# Patient Record
Sex: FEMALE | Race: BLACK OR AFRICAN AMERICAN | NOT HISPANIC OR LATINO | Employment: UNEMPLOYED | URBAN - METROPOLITAN AREA
[De-identification: names, ages, dates, MRNs, and addresses within clinical notes are randomized per-mention and may not be internally consistent; named-entity substitution may affect disease eponyms.]

---

## 2023-09-08 ENCOUNTER — HOSPITAL ENCOUNTER (EMERGENCY)
Facility: HOSPITAL | Age: 28
Discharge: HOME | End: 2023-09-08
Attending: EMERGENCY MEDICINE
Payer: MEDICAID

## 2023-09-08 VITALS
HEART RATE: 83 BPM | DIASTOLIC BLOOD PRESSURE: 90 MMHG | WEIGHT: 144 LBS | OXYGEN SATURATION: 99 % | TEMPERATURE: 99 F | BODY MASS INDEX: 23.99 KG/M2 | RESPIRATION RATE: 20 BRPM | HEIGHT: 65 IN | SYSTOLIC BLOOD PRESSURE: 143 MMHG

## 2023-09-08 DIAGNOSIS — B34.9 VIRAL SYNDROME: Primary | ICD-10-CM

## 2023-09-08 LAB
GROUP A STREP, MOLECULAR: NEGATIVE
INFLUENZA A, MOLECULAR: NEGATIVE
INFLUENZA B, MOLECULAR: NEGATIVE
SARS-COV-2 RDRP RESP QL NAA+PROBE: NEGATIVE
SPECIMEN SOURCE: NORMAL

## 2023-09-08 PROCEDURE — 99283 EMERGENCY DEPT VISIT LOW MDM: CPT

## 2023-09-08 PROCEDURE — 87502 INFLUENZA DNA AMP PROBE: CPT | Performed by: EMERGENCY MEDICINE

## 2023-09-08 PROCEDURE — 87651 STREP A DNA AMP PROBE: CPT | Performed by: EMERGENCY MEDICINE

## 2023-09-08 PROCEDURE — U0002 COVID-19 LAB TEST NON-CDC: HCPCS | Performed by: EMERGENCY MEDICINE

## 2023-09-08 RX ORDER — ONDANSETRON 4 MG/1
4 TABLET, ORALLY DISINTEGRATING ORAL EVERY 8 HOURS PRN
Qty: 12 TABLET | Refills: 0 | Status: SHIPPED | OUTPATIENT
Start: 2023-09-08

## 2023-09-08 NOTE — Clinical Note
"Yvan Ceballosa" Karena was seen and treated in our emergency department on 9/8/2023.  She may return to work on 09/11/2023.       If you have any questions or concerns, please don't hesitate to call.       RN    "

## 2023-09-08 NOTE — ED PROVIDER NOTES
Encounter Date: 9/8/2023       History     Chief Complaint   Patient presents with    Generalized Body Aches     With sore throat     28-year-old female with no past medical history presents with multiple complaints.  The patient reports that she woke up tonight with a sore throat, nonproductive cough, nausea, and body aches.  She denies any associated vomiting, diarrhea, fever/chills, shortness of breath, chest pain, dysuria, or abdominal pain.  There are no alleviating or aggravating factors.  The patient has not taken anything for relief of her symptoms.  She denies any recent sick contacts.      Review of patient's allergies indicates:  No Known Allergies  No past medical history on file.  No past surgical history on file.  No family history on file.     Review of Systems   Constitutional:  Negative for chills and fever.   HENT:  Positive for sore throat. Negative for congestion.    Respiratory:  Positive for cough. Negative for shortness of breath.    Cardiovascular:  Negative for chest pain.   Gastrointestinal:  Positive for nausea. Negative for abdominal pain and vomiting.   Genitourinary:  Negative for dysuria.   Musculoskeletal:  Positive for myalgias. Negative for gait problem.   Skin:  Negative for color change.   Neurological:  Negative for dizziness and numbness.   Psychiatric/Behavioral:  Negative for agitation.        Physical Exam     Initial Vitals [09/08/23 0404]   BP Pulse Resp Temp SpO2   (!) 143/90 83 20 98.8 °F (37.1 °C) 99 %      MAP       --         Physical Exam    Nursing note and vitals reviewed.  Constitutional: She appears well-developed and well-nourished.   HENT:   Head: Normocephalic and atraumatic.   Nose: Nose normal.   Mouth/Throat: Oropharynx is clear and moist. No oropharyngeal exudate.   Uvula is midline.  No peritonsillar edema or abscess noted.   Eyes: EOM are normal. Pupils are equal, round, and reactive to light.   Neck:   Normal range of motion.  Cardiovascular:  Normal  rate and regular rhythm.           Pulmonary/Chest: Breath sounds normal.   Abdominal: Abdomen is soft. Bowel sounds are normal. She exhibits no distension. There is no abdominal tenderness. There is no rebound and no guarding.   Musculoskeletal:         General: Normal range of motion.      Right shoulder: Normal.      Left shoulder: Normal.      Cervical back: Normal range of motion.     Neurological: She is alert and oriented to person, place, and time.   Skin: Skin is warm and dry.   Psychiatric: She has a normal mood and affect.         ED Course   Procedures  Labs Reviewed   INFLUENZA A & B BY MOLECULAR   GROUP A STREP, MOLECULAR   SARS-COV-2 RNA AMPLIFICATION, QUAL          Imaging Results    None          Medications - No data to display  Medical Decision Making  Risk  Prescription drug management.                          Medical Decision Making:   Initial Assessment:   28-year-old female presented with multiple complaints.  Differential Diagnosis:   Initial differential diagnosis included but not limited to COVID, influenza, and strep infection.  Clinical Tests:   Lab Tests: Ordered and Reviewed  ED Management:  The patient was emergently evaluated in the emergency department, her evaluation was significant for a young female with a benign exam.  The patient's COVID, influenza, and strep tests were noted to be negative.  The etiology of her symptoms is likely a different viral illness.  The patient is stable for discharge home and does not require further workup or care at present.  She will be discharged home with a prescription for ODT Zofran.  She is to take over-the-counter medication as needed for symptomatic relief.  She is referred to primary care for follow-up.      Clinical Impression:   Final diagnoses:  [B34.9] Viral syndrome (Primary)        ED Disposition Condition    Discharge Stable          ED Prescriptions       Medication Sig Dispense Start Date End Date Auth. Provider    ondansetron  (ZOFRAN-ODT) 4 MG TbDL Take 1 tablet (4 mg total) by mouth every 8 (eight) hours as needed (nausea/vomiting). 12 tablet 9/8/2023 -- Markell Maloney MD          Follow-up Information       Follow up With Specialties Details Why Contact Info    [facility]  Schedule an appointment as soon as possible for a visit   [address] [place] [id number]  [phone]               Markell Maloney MD  09/08/23 6680

## 2023-09-23 ENCOUNTER — HOSPITAL ENCOUNTER (EMERGENCY)
Facility: HOSPITAL | Age: 28
Discharge: LEFT BEFORE DISCHARGE | End: 2023-09-23
Attending: EMERGENCY MEDICINE
Payer: MEDICAID

## 2023-09-23 VITALS
WEIGHT: 180 LBS | DIASTOLIC BLOOD PRESSURE: 67 MMHG | BODY MASS INDEX: 28.93 KG/M2 | HEART RATE: 86 BPM | HEIGHT: 66 IN | TEMPERATURE: 98 F | SYSTOLIC BLOOD PRESSURE: 129 MMHG | RESPIRATION RATE: 18 BRPM | OXYGEN SATURATION: 98 %

## 2023-09-23 DIAGNOSIS — R10.9 ABDOMINAL PAIN, UNSPECIFIED ABDOMINAL LOCATION: Primary | ICD-10-CM

## 2023-09-23 DIAGNOSIS — Z34.90 EARLY STAGE OF PREGNANCY: ICD-10-CM

## 2023-09-23 LAB
ALBUMIN SERPL BCP-MCNC: 3.7 G/DL (ref 3.5–5.2)
ALP SERPL-CCNC: 99 U/L (ref 55–135)
ALT SERPL W/O P-5'-P-CCNC: 14 U/L (ref 10–44)
ANION GAP SERPL CALC-SCNC: 9 MMOL/L (ref 8–16)
AST SERPL-CCNC: 13 U/L (ref 10–40)
B-HCG UR QL: POSITIVE
BACTERIA #/AREA URNS HPF: NORMAL /HPF
BASOPHILS # BLD AUTO: 0.03 K/UL (ref 0–0.2)
BASOPHILS NFR BLD: 0.3 % (ref 0–1.9)
BILIRUB SERPL-MCNC: 0.3 MG/DL (ref 0.1–1)
BILIRUB UR QL STRIP: NEGATIVE
BUN SERPL-MCNC: 6 MG/DL (ref 6–20)
CALCIUM SERPL-MCNC: 8.9 MG/DL (ref 8.7–10.5)
CHLORIDE SERPL-SCNC: 103 MMOL/L (ref 95–110)
CLARITY UR: ABNORMAL
CO2 SERPL-SCNC: 24 MMOL/L (ref 23–29)
COLOR UR: YELLOW
CREAT SERPL-MCNC: 0.7 MG/DL (ref 0.5–1.4)
CTP QC/QA: YES
DIFFERENTIAL METHOD: ABNORMAL
EOSINOPHIL # BLD AUTO: 0.1 K/UL (ref 0–0.5)
EOSINOPHIL NFR BLD: 1.2 % (ref 0–8)
ERYTHROCYTE [DISTWIDTH] IN BLOOD BY AUTOMATED COUNT: 13.8 % (ref 11.5–14.5)
EST. GFR  (NO RACE VARIABLE): >60 ML/MIN/1.73 M^2
GLUCOSE SERPL-MCNC: 110 MG/DL (ref 70–110)
GLUCOSE UR QL STRIP: NEGATIVE
HCG INTACT+B SERPL-ACNC: NORMAL MIU/ML
HCT VFR BLD AUTO: 39 % (ref 37–48.5)
HGB BLD-MCNC: 12.8 G/DL (ref 12–16)
HGB UR QL STRIP: NEGATIVE
IMM GRANULOCYTES # BLD AUTO: 0.03 K/UL (ref 0–0.04)
IMM GRANULOCYTES NFR BLD AUTO: 0.3 % (ref 0–0.5)
KETONES UR QL STRIP: NEGATIVE
LEUKOCYTE ESTERASE UR QL STRIP: ABNORMAL
LIPASE SERPL-CCNC: 10 U/L (ref 4–60)
LYMPHOCYTES # BLD AUTO: 1.6 K/UL (ref 1–4.8)
LYMPHOCYTES NFR BLD: 16.2 % (ref 18–48)
MCH RBC QN AUTO: 28 PG (ref 27–31)
MCHC RBC AUTO-ENTMCNC: 32.8 G/DL (ref 32–36)
MCV RBC AUTO: 85 FL (ref 82–98)
MICROSCOPIC COMMENT: NORMAL
MONOCYTES # BLD AUTO: 0.6 K/UL (ref 0.3–1)
MONOCYTES NFR BLD: 6.3 % (ref 4–15)
NEUTROPHILS # BLD AUTO: 7.6 K/UL (ref 1.8–7.7)
NEUTROPHILS NFR BLD: 75.7 % (ref 38–73)
NITRITE UR QL STRIP: POSITIVE
NRBC BLD-RTO: 0 /100 WBC
PH UR STRIP: 6 [PH] (ref 5–8)
PLATELET # BLD AUTO: 258 K/UL (ref 150–450)
PMV BLD AUTO: 9.8 FL (ref 9.2–12.9)
POTASSIUM SERPL-SCNC: 3.2 MMOL/L (ref 3.5–5.1)
PROT SERPL-MCNC: 7.2 G/DL (ref 6–8.4)
PROT UR QL STRIP: ABNORMAL
RBC # BLD AUTO: 4.57 M/UL (ref 4–5.4)
RBC #/AREA URNS HPF: 1 /HPF (ref 0–4)
SODIUM SERPL-SCNC: 136 MMOL/L (ref 136–145)
SP GR UR STRIP: 1.02 (ref 1–1.03)
SQUAMOUS #/AREA URNS HPF: 20 /HPF
URN SPEC COLLECT METH UR: ABNORMAL
UROBILINOGEN UR STRIP-ACNC: NEGATIVE EU/DL
WBC # BLD AUTO: 10.07 K/UL (ref 3.9–12.7)
WBC #/AREA URNS HPF: 4 /HPF (ref 0–5)

## 2023-09-23 PROCEDURE — 83690 ASSAY OF LIPASE: CPT | Performed by: NURSE PRACTITIONER

## 2023-09-23 PROCEDURE — 81000 URINALYSIS NONAUTO W/SCOPE: CPT | Performed by: NURSE PRACTITIONER

## 2023-09-23 PROCEDURE — 84702 CHORIONIC GONADOTROPIN TEST: CPT | Performed by: NURSE PRACTITIONER

## 2023-09-23 PROCEDURE — 36415 COLL VENOUS BLD VENIPUNCTURE: CPT | Performed by: NURSE PRACTITIONER

## 2023-09-23 PROCEDURE — 80053 COMPREHEN METABOLIC PANEL: CPT | Performed by: NURSE PRACTITIONER

## 2023-09-23 PROCEDURE — 99284 EMERGENCY DEPT VISIT MOD MDM: CPT | Mod: 25

## 2023-09-23 PROCEDURE — 81025 URINE PREGNANCY TEST: CPT | Performed by: NURSE PRACTITIONER

## 2023-09-23 PROCEDURE — 85025 COMPLETE CBC W/AUTO DIFF WBC: CPT | Performed by: NURSE PRACTITIONER

## 2023-09-23 NOTE — ED NOTES
pt with last vaginal delivery approx 5 months ago. LMP 23 denies vag bleeding/discharge at present. Denies n/v/d/constipation/dysuria at this time. Pt asking if she will get US today and informed that is deferred to NP/MD

## 2023-09-23 NOTE — ED NOTES
Pt states she needs to leave to  her baby, without doing pelvic exam. TAMY Cote NP advised; AMA form signed by pt

## 2023-09-23 NOTE — ED PROVIDER NOTES
Encounter Date: 9/23/2023       History     Chief Complaint   Patient presents with    Abdominal Pain     Approx. 6 weeks pregnant      Patient is a 28 y.o. female who presents to the ED 09/23/2023 with a chief complaint of abdominal pain. Pt states she just found out she was pregnant within the last week. LMP 8/6/2023.  She states she has had 3 previous pregnancies that she delivered at term with no complications. She states yesterday she had some lower abdominal pain yesterday that is better today but she would like an US. She denies any vaginal bleeding. She denies any nausea or vomiting or diarrhea or fever or other symptoms. She denies any medical problems.              Review of patient's allergies indicates:  No Known Allergies  No past medical history on file.  No past surgical history on file.  No family history on file.     Review of Systems   Constitutional:  Negative for fever.   HENT:  Negative for sore throat.    Respiratory:  Negative for shortness of breath.    Cardiovascular:  Negative for chest pain.   Gastrointestinal:  Positive for abdominal pain. Negative for constipation, diarrhea, nausea and vomiting.   Genitourinary:  Negative for dysuria and vaginal bleeding.   Musculoskeletal:  Negative for back pain.   Skin:  Negative for rash.   Neurological:  Negative for weakness.   Hematological:  Does not bruise/bleed easily.       Physical Exam     Initial Vitals [09/23/23 1044]   BP Pulse Resp Temp SpO2   129/67 86 18 98.2 °F (36.8 °C) 98 %      MAP       --         Physical Exam    Nursing note and vitals reviewed.  Constitutional: Vital signs are normal. She appears well-developed and well-nourished.   HENT:   Head: Normocephalic and atraumatic.   Eyes: Pupils are equal, round, and reactive to light.   Neck: Neck supple.   Cardiovascular:  Normal rate, regular rhythm, normal heart sounds and intact distal pulses.     Exam reveals no gallop and no friction rub.       No murmur  heard.  Pulmonary/Chest: Breath sounds normal. She has no wheezes. She has no rhonchi. She has no rales.   Abdominal: Abdomen is soft. Bowel sounds are normal. There is abdominal tenderness in the suprapubic area and left lower quadrant. There is no rebound, no guarding, no tenderness at McBurney's point and negative Baird's sign.   Musculoskeletal:      Cervical back: Neck supple.     Neurological: She is alert and oriented to person, place, and time. She has normal strength.   Skin: Skin is warm, dry and intact.   Psychiatric: She has a normal mood and affect. Her speech is normal and behavior is normal.         ED Course   Procedures  Labs Reviewed   CBC W/ AUTO DIFFERENTIAL - Abnormal; Notable for the following components:       Result Value    Gran % 75.7 (*)     Lymph % 16.2 (*)     All other components within normal limits    Narrative:     Release to patient->Immediate   COMPREHENSIVE METABOLIC PANEL - Abnormal; Notable for the following components:    Potassium 3.2 (*)     All other components within normal limits    Narrative:     Release to patient->Immediate   URINALYSIS, REFLEX TO URINE CULTURE - Abnormal; Notable for the following components:    Appearance, UA Hazy (*)     Protein, UA Trace (*)     Nitrite, UA Positive (*)     Leukocytes, UA Trace (*)     All other components within normal limits    Narrative:     Specimen Source->Urine   POCT URINE PREGNANCY - Abnormal; Notable for the following components:    POC Preg Test, Ur Positive (*)     All other components within normal limits   LIPASE    Narrative:     Release to patient->Immediate   HCG, QUANTITATIVE    Narrative:     Release to patient->Immediate   URINALYSIS MICROSCOPIC    Narrative:     Specimen Source->Urine          Imaging Results              US OB <14 Wks TransAbd & TransVag, Single Gestation (XPD) (Final result)  Result time 09/23/23 12:40:09      Final result by Zachery Seals Jr., MD (09/23/23 12:40:09)                    Impression:      Single live intrauterine pregnancy with estimated gestational age 6 weeks 4 days. and an NATHAN of May 14, 2024..      Electronically signed by: Zachery Seals MD  Date:    09/23/2023  Time:    12:40               Narrative:    EXAMINATION:  US OB <14 WEEKS, TRANSABDOM & TRANSVAG, SINGLE GESTATION (XPD)    CLINICAL HISTORY:  PAIN;    TECHNIQUE:  Transabdominal sonography of the pelvis was performed, followed by transvaginal sonography to better evaluate the uterus and ovaries.    COMPARISON:  None.    FINDINGS:  Intrauterine gestation(s): Single    Mean gestational sac diameter: 15.5 mm consistent with a gestational age of 6 weeks 3 days.    Yolk sac: Present    Crown-rump length (CRL): 6.7 mm consistent with a gestational age of 6 weeks 4 days    Cardiac activity: 169 bpm    Subchorionic hemorrhage: Minor measuring 8 mm    Right ovary: Normal.  3.2 x 2.8 cm    Left ovary: Normal.  2.5 x 2.3 cm    Miscellaneous: No Free Fluid.                                       Medications - No data to display  Medical Decision Making  Amount and/or Complexity of Data Reviewed  Labs: ordered.  Radiology: ordered.         APC / Resident Notes:   Patient is a 28 y.o. female who presents to the ED 09/23/2023 who underwent emergent evaluation for lower abdominal pain that is better today per pt. She is 6 weeks pregnant. She denies vaginal bleeding. She has no mcburney's point tenderness. She has initial mild LLQ tenderness that resolves in the ED. Labs unremarkable. Pt underwent US without evidence of ectopic pregnancy and she is not bleeding and US consistent with single live intrauterine pregnancy. Recommend further evaluation with pelvic exam but pt leaves AMA immediately following US. Labs unremarkable. Beta HCG elevated consistent with pregnancy. I do not think other emergent intraabdominal process such as appendicitis or diverticulitis or abscess or perforation. I doubt PID but discussed possible missed  diagnosis given unable to perform exam. Pt declines further exam or testing in the ED at this time.Discussed U/A results and pt wishes to defer abx at this time and follow up with gynecology which I recommended her to do so in the next 48 hours. Discussed possible early miscarriage with pt as well.    Alert and oriented to person place and situation.  I explained the risks of worsening condition, death etc in layman's terms to the patient.  Patient voices these risks back to me.   Patient is not altered and is not under the influence.  Patient is welcome to return to the hospital at any time if he chooses to do so.  I will Discharge the patient AGAINST MEDICAL ADVICE.                             Medical Decision Making:   Differential Diagnosis:   Ectopic pregnancy  Diverticulitis  Constipation       Clinical Impression:   Final diagnoses:  [R10.9] Abdominal pain, unspecified abdominal location (Primary)        ED Disposition Condition    AMA Stable                Breann Cote NP  09/23/23 6215

## 2023-09-23 NOTE — LETTER
Patient: Yvan Thurston  YOB: 1995  Date: 9/23/2023 Time: 1:00 PM  Location: [facility]    Leaving the Hospital Against Medical Advice    Chart #:[id number]    This will certify that I, the undersigned,    ______________________________________________________________________    A patient in the above named medical center, having requested discharge and removal from the medical center against the advice of my attending physician(s), hereby release [facility], its physicians, officers and employees, severally and individually, from any and all liability of any nature whatsoever for any injury or harm or complication of any kind that may result directly or indirectly, by reason of my terminating my stay as a patient at [facility] and my departure from [facility], and hereby waive any and all rights of action I may now have or later acquire as a result of my voluntary departure from [facility] and the termination of my stay as a patient therein.    This release is made with the full knowledge of the danger that may result from the action which I am taking.      Date:_______________________                         ___________________________                                                                                    Patient/Legal Representative    Witness:        ____________________________                          ___________________________  Nurse                                                                        Physician

## 2023-09-25 ENCOUNTER — LAB VISIT (OUTPATIENT)
Dept: LAB | Facility: HOSPITAL | Age: 28
End: 2023-09-25
Attending: SPECIALIST
Payer: MEDICAID

## 2023-09-25 DIAGNOSIS — E07.9 MATERNAL THYROID DYSFUNCTION, ANTEPARTUM: ICD-10-CM

## 2023-09-25 DIAGNOSIS — Z34.81 PRENATAL CARE, SUBSEQUENT PREGNANCY, FIRST TRIMESTER: Primary | ICD-10-CM

## 2023-09-25 DIAGNOSIS — O99.280 MATERNAL THYROID DYSFUNCTION, ANTEPARTUM: ICD-10-CM

## 2023-09-25 LAB
BASOPHILS # BLD AUTO: 0.03 K/UL (ref 0–0.2)
BASOPHILS NFR BLD: 0.4 % (ref 0–1.9)
DIFFERENTIAL METHOD: ABNORMAL
EOSINOPHIL # BLD AUTO: 0.1 K/UL (ref 0–0.5)
EOSINOPHIL NFR BLD: 1.3 % (ref 0–8)
ERYTHROCYTE [DISTWIDTH] IN BLOOD BY AUTOMATED COUNT: 13.6 % (ref 11.5–14.5)
HCG INTACT+B SERPL-ACNC: NORMAL MIU/ML
HCT VFR BLD AUTO: 37.5 % (ref 37–48.5)
HGB BLD-MCNC: 11.9 G/DL (ref 12–16)
IMM GRANULOCYTES # BLD AUTO: 0.03 K/UL (ref 0–0.04)
IMM GRANULOCYTES NFR BLD AUTO: 0.4 % (ref 0–0.5)
LYMPHOCYTES # BLD AUTO: 1.6 K/UL (ref 1–4.8)
LYMPHOCYTES NFR BLD: 19 % (ref 18–48)
MCH RBC QN AUTO: 27 PG (ref 27–31)
MCHC RBC AUTO-ENTMCNC: 31.7 G/DL (ref 32–36)
MCV RBC AUTO: 85 FL (ref 82–98)
MONOCYTES # BLD AUTO: 0.6 K/UL (ref 0.3–1)
MONOCYTES NFR BLD: 6.9 % (ref 4–15)
NEUTROPHILS # BLD AUTO: 6 K/UL (ref 1.8–7.7)
NEUTROPHILS NFR BLD: 72 % (ref 38–73)
NRBC BLD-RTO: 0 /100 WBC
PLATELET # BLD AUTO: 255 K/UL (ref 150–450)
PMV BLD AUTO: 9.8 FL (ref 9.2–12.9)
RBC # BLD AUTO: 4.41 M/UL (ref 4–5.4)
WBC # BLD AUTO: 8.32 K/UL (ref 3.9–12.7)

## 2023-09-25 PROCEDURE — 84144 ASSAY OF PROGESTERONE: CPT | Performed by: SPECIALIST

## 2023-09-25 PROCEDURE — 84702 CHORIONIC GONADOTROPIN TEST: CPT | Performed by: SPECIALIST

## 2023-09-25 PROCEDURE — 85025 COMPLETE CBC W/AUTO DIFF WBC: CPT | Performed by: SPECIALIST

## 2023-09-26 LAB
ABO + RH BLD: NORMAL
ANTIBODY SCREEN: NEGATIVE
HBV SURFACE AG SERPL QL IA: NEGATIVE
HIV 1+2 AB+HIV1 P24 AG SERPL QL IA: NEGATIVE
PROGEST SERPL-MCNC: 15 NG/ML
RPR: NON REACTIVE
RUBELLA IMMUNE STATUS: NORMAL

## 2023-11-08 LAB
C TRACH RRNA SPEC QL PROBE: NEGATIVE
MARIJUANA (THC) METABOLITE: POSITIVE
N GONORRHOEAE, AMPLIFIED DNA: NEGATIVE

## 2023-12-27 ENCOUNTER — HOSPITAL ENCOUNTER (EMERGENCY)
Facility: HOSPITAL | Age: 28
Discharge: HOME | End: 2023-12-27
Attending: EMERGENCY MEDICINE
Payer: MEDICAID

## 2023-12-27 VITALS
OXYGEN SATURATION: 100 % | HEART RATE: 104 BPM | SYSTOLIC BLOOD PRESSURE: 120 MMHG | TEMPERATURE: 100 F | BODY MASS INDEX: 28.93 KG/M2 | WEIGHT: 180 LBS | RESPIRATION RATE: 17 BRPM | HEIGHT: 66 IN | DIASTOLIC BLOOD PRESSURE: 71 MMHG

## 2023-12-27 DIAGNOSIS — J11.1 INFLUENZA: Primary | ICD-10-CM

## 2023-12-27 LAB
INFLUENZA A, MOLECULAR: POSITIVE
INFLUENZA B, MOLECULAR: NEGATIVE
SARS-COV-2 RDRP RESP QL NAA+PROBE: NEGATIVE
SPECIMEN SOURCE: ABNORMAL

## 2023-12-27 PROCEDURE — 87502 INFLUENZA DNA AMP PROBE: CPT | Performed by: PHYSICIAN ASSISTANT

## 2023-12-27 PROCEDURE — 99283 EMERGENCY DEPT VISIT LOW MDM: CPT

## 2023-12-27 PROCEDURE — 25000003 PHARM REV CODE 250: Performed by: EMERGENCY MEDICINE

## 2023-12-27 PROCEDURE — U0002 COVID-19 LAB TEST NON-CDC: HCPCS | Performed by: PHYSICIAN ASSISTANT

## 2023-12-27 RX ORDER — ACETAMINOPHEN 325 MG/1
650 TABLET ORAL
Status: COMPLETED | OUTPATIENT
Start: 2023-12-27 | End: 2023-12-27

## 2023-12-27 RX ORDER — OSELTAMIVIR PHOSPHATE 75 MG/1
75 CAPSULE ORAL 2 TIMES DAILY
Qty: 10 CAPSULE | Refills: 0 | Status: SHIPPED | OUTPATIENT
Start: 2023-12-27 | End: 2024-01-01

## 2023-12-27 RX ADMIN — ACETAMINOPHEN 650 MG: 325 TABLET ORAL at 02:12

## 2023-12-27 NOTE — FIRST PROVIDER EVALUATION
Emergency Department TeleTriage Encounter Note      CHIEF COMPLAINT    Chief Complaint   Patient presents with    Cough     X 1 DAY, PT IS ALSO 20 WEEKS PREG    Generalized Body Aches       VITAL SIGNS   Initial Vitals [12/27/23 1146]   BP Pulse Resp Temp SpO2   121/78 107 18 99.8 °F (37.7 °C) 98 %      MAP       --            ALLERGIES    Review of patient's allergies indicates:  No Known Allergies    PROVIDER TRIAGE NOTE  This is a teletriage evaluation of a 28 y.o. female presenting to the ED complaining of cough. Patient reports body aches and cough since yesterday. She is 20 weeks pregnant. She has not taken any medications.    Patient is alert and oriented. She speaks in complete sentences. She is sitting upright in the chair in no distress.     Initial orders will be placed and care will be transferred to an alternate provider when patient is roomed for a full evaluation. Any additional orders and the final disposition will be determined by that provider.         ORDERS  Labs Reviewed   SARS-COV-2 RNA AMPLIFICATION, QUAL   INFLUENZA A AND B ANTIGEN       ED Orders (720h ago, onward)      Start Ordered     Status Ordering Provider    12/27/23 1201 12/27/23 1200  COVID-19 Rapid Screening  STAT         Ordered EUGENIA ROUSE    12/27/23 1201 12/27/23 1200  Influenza antigen Nasopharyngeal Swab  STAT         Ordered EUGENIA ROUSE              Virtual Visit Note: The provider triage portion of this emergency department evaluation and documentation was performed via [employer], a HIPAA-compliant telemedicine application, in concert with a tele-presenter in the room. A face to face patient evaluation with one of my colleagues will occur once the patient is placed in an emergency department room.      DISCLAIMER: This note was prepared with [employer] voice recognition transcription software. Garbled syntax, mangled pronouns, and other bizarre constructions may be attributed to that software system.

## 2023-12-27 NOTE — ED PROVIDER NOTES
Encounter Date: 2023       History     Chief Complaint   Patient presents with    Cough     X 1 DAY, PT IS ALSO 20 WEEKS PREG    Generalized Body Aches     28-year-old female presents emergency department no significant past medical history currently proximally 20 weeks pregnant , LMP 23 , patient of Dr. Quiles.  Patient has no pregnancy-related symptoms today denies any vaginal bleeding discharge reports positive fetal movement.  Patient states she has had cough congestion runny nose sore throat that started yesterday.  With subjective fevers.  Patient has taken no Tylenol today denies any chest pain pleuritic pain or shortness of breath      Review of patient's allergies indicates:  No Known Allergies  No past medical history on file.  No past surgical history on file.  No family history on file.     Review of Systems   Constitutional:  Positive for chills and fever.   HENT:  Positive for congestion and sore throat.    Respiratory:  Positive for cough. Negative for shortness of breath, wheezing and stridor.    Cardiovascular: Negative.    Gastrointestinal: Negative.    Genitourinary: Negative.    Musculoskeletal: Negative.    Neurological: Negative.    Hematological: Negative.    Psychiatric/Behavioral: Negative.     All other systems reviewed and are negative.      Physical Exam     Initial Vitals [23 1146]   BP Pulse Resp Temp SpO2   121/78 107 18 99.8 °F (37.7 °C) 98 %      MAP       --         Physical Exam    Nursing note and vitals reviewed.  Constitutional: She appears well-developed.   HENT:   Head: Normocephalic.   Right Ear: External ear normal.   Left Ear: External ear normal.   Mouth/Throat: No oropharyngeal exudate.   Eyes: EOM are normal. Pupils are equal, round, and reactive to light.   Neck: Neck supple.   Normal range of motion.  Cardiovascular:  Normal rate.           Pulmonary/Chest: Breath sounds normal.   Abdominal: Abdomen is soft. Bowel sounds are normal. She exhibits  no distension. There is no abdominal tenderness.   Musculoskeletal:      Cervical back: Normal range of motion and neck supple.     Neurological: She is alert and oriented to person, place, and time. GCS score is 15. GCS eye subscore is 4. GCS verbal subscore is 5. GCS motor subscore is 6.   Skin: Skin is warm. No rash noted.   Psychiatric: She has a normal mood and affect.         ED Course   Procedures  Labs Reviewed   INFLUENZA A AND B ANTIGEN - Abnormal; Notable for the following components:       Result Value    Influenza A, Molecular Positive (*)     All other components within normal limits    Narrative:     Specimen Source->Nasopharyngeal Swab     critical result(s) called  Patient is positive Flu A only, and   verbal readback obtained from Siomara Mckeon Rn by [facility] 2023   13:23   SARS-COV-2 RNA AMPLIFICATION, QUAL          Imaging Results    None          Medications   acetaminophen tablet 650 mg (650 mg Oral Given 23 1434)     Medical Decision Making  28-year-old female presents emergency department no significant past medical history currently proximally 20 weeks pregnant , LMP 23 , patient of Dr. Quiles.  Patient has no pregnancy-related symptoms today denies any vaginal bleeding discharge reports positive fetal movement.  Patient states she has had cough congestion runny nose sore throat that started yesterday.  With subjective fevers.  Patient has taken no Tylenol today denies any chest pain pleuritic pain or shortness of breath    Considerations include but not limited to, strep pharyngitis, COVID, influenza or viral URI    28-year-old female presents emergency department currently proximally 20 weeks pregnant denies any pregnancy-related complaints.  Patient has positive fetal movements, no vaginal discharge or bleeding..  Patient did test positive for influenza which is consistent with patient's symptoms and exam.  She will be discharged home instructed to take Tylenol for  fever/body aches, and be prescribed Tamiflu.        Amount and/or Complexity of Data Reviewed  Labs: ordered. Decision-making details documented in ED Course.    Risk  OTC drugs.  Prescription drug management.                                  Attending Note: I discussed the patient's care with Advanced Practice Clinician.  I reviewed their note and agree with the history, physical, assessment, diagnosis, treatment, all procedures performed, xray and EKG interpretations and discharge plan provided by the Advanced Practice Clinician. My overall impression is influenza .  The patient has been instructed to follow up with their physician or the one provided as well as specific return precautions.   Kimberly Glass M.D. 12/27/2023 4:07 PM      Clinical Impression:  Final diagnoses:  [J11.1] Influenza (Primary)          ED Disposition Condition    Discharge Stable          ED Prescriptions       Medication Sig Dispense Start Date End Date Auth. Provider    oseltamivir (TAMIFLU) 75 MG capsule Take 1 capsule (75 mg total) by mouth 2 (two) times daily. for 5 days 10 capsule 12/27/2023 1/1/2024 Kirti Sims FNP          Follow-up Information    None          Kirti Sims FNP  12/27/23 7608       Kimberly Glass MD  12/27/23 5791

## 2024-04-15 ENCOUNTER — HOSPITAL ENCOUNTER (OUTPATIENT)
Facility: HOSPITAL | Age: 29
Discharge: HOME | End: 2024-04-15
Attending: SPECIALIST | Admitting: SPECIALIST
Payer: MEDICAID

## 2024-04-15 VITALS
DIASTOLIC BLOOD PRESSURE: 97 MMHG | RESPIRATION RATE: 17 BRPM | TEMPERATURE: 98 F | BODY MASS INDEX: 29.57 KG/M2 | HEART RATE: 98 BPM | OXYGEN SATURATION: 99 % | SYSTOLIC BLOOD PRESSURE: 144 MMHG | WEIGHT: 184 LBS | HEIGHT: 66 IN

## 2024-04-15 DIAGNOSIS — O47.00 PRETERM CONTRACTIONS: ICD-10-CM

## 2024-04-15 PROCEDURE — 99211 OFF/OP EST MAY X REQ PHY/QHP: CPT

## 2024-04-15 RX ORDER — ONDANSETRON 4 MG/1
8 TABLET, ORALLY DISINTEGRATING ORAL EVERY 8 HOURS PRN
Status: DISCONTINUED | OUTPATIENT
Start: 2024-04-15 | End: 2024-04-15 | Stop reason: HOSPADM

## 2024-04-15 NOTE — NURSING
OBGYN LABS ENTERED INTO RESULTS CONSOLE      1st Trimester Labs Entered Into Results Console     [x] AOBRH   [x] Rubella Immune   [x] RPR   [x] HBsAg   [x] HIV   [] Chlamydia   [] Gonorrhea   [] Cell-Free DNA   [] Hep-C   [] Varicella    2nd Trimester Labs Entered Into Results Console     []OB Glucose Screen      3rd Trimester Labs Entered Into Results Console      [] GBS   [] RPR    Drug Screen Entered Into Results Console     [] Benzodiazes   [] Methadone   [] Cocaine (Metab)   [] Opiate   [] Amphetamine   [] Marijuana   [] Creatinine   [] Amphetamines-Beaker   [] Barbituates-Beaker   [] Benzodiazepine-Beaker   [] Cannabinoids-Beaker   [] Cocaine-Beaker   [] Fentanyl-Beaker   [] MDMA-Beaker   [] Opiates-Beaker   [] Phencyclidine-Beaker        Results Entered by Izabel Branch RN    Results Verified for Manual Entry Accuracy by Eileen CHOI

## 2024-04-15 NOTE — NURSING
[facility]  Department of Obstetrics and Gynecology  Labor & Delivery Triage Assessment    PATIENT NAME: Yvan Thurston  MRN: [id number]  TODAY'S DATE: 04/15/2024    CHIEF COMPLAINT: No chief complaint on file.      OB History    Para Term  AB Living   4 3 3 0 0 3   SAB IAB Ectopic Multiple Live Births   0 0 0 0 1      # Outcome Date GA Lbr Daniel/2nd Weight Sex Type Anes PTL Lv   4 Current            3 Term 23 39w0d    Vag-Spont      2 Term 12/05/15 38w0d          1 Term 2011 38w0d    Vag-Spont   YSABEL     No past medical history on file.  No past surgical history on file.      VITAL SIGNS - ABNORMAL VITALS INCLUDE TEMP >100.4,RR <12 or >26, SUSTAINED MATERNAL PULSE <60 or >120     VITAL SIGNS (Most Recent)  Temp: 98.1 °F (36.7 °C) (04/15/24 1532)  Pulse: 98 (04/15/24 1533)  Resp: 17 (04/15/24 1532)  BP: (!) 144/97 (04/15/24 1532)  SpO2: 99 % (04/15/24 1533)    VITAL SIGNS     normal  HEADACHE    no     VOMITING    no  VISUAL DISTURBANCES  no  EPIGASTRIC PAIN        no  PROTEINURIA 2+ or MORE             N/A   EDEMA FACE/EXTREMITIES            no    FETAL MOVEMENT     FETAL MOVEMENT: Active  FETAL HEART RATE BASELINE =  140  normal  FETAL HEART RATE VARIABILITY:  Moderate  FETAL HEART RATE ACCELERATIONS FOR GESTATIONAL AGE: present  FETAL HEART RATE DECELERATIONS: none    ABDOMINAL PAIN/CRAMPING/CONTRACTIONS     Patient is complaining of abdominal pain/cramping/contractions. Contraction pattern is Irregular, less than 5 minutes apart. Contraction strength is mild. Resting tone is relaxed. Abdominal palpation is non-tender.    RUPTURE OF MEMBRANES OR LEAKING OF AMNIOTIC FLUID     Patient denies ROM or leaking of amniotic fluid.    VAGINAL BLEEDING     Patient denies vaginal bleeding.    VAGINAL EXAM     DILATION:  0  STATION:  -4  EFFACEMENT:  50  PRESENTATION:  N/A    VAGINAL EXAM DEFERRED DUE TO:   Exam done    PAIN PRESENT ON ARRIVAL     ONSET:     LOCATION:  abdomen  PAIN SCALE  (0-10):  8  DESCRIPTION: Cramping    Interventions     Education given. Pt will keep appt with Dr. Quiles on 4/17      PATIENT DISPOSITION     Discharged Home      Dr. Quiles notified at 1555 of the above assessment.    Izabel Branch RN  [facility]  04/15/2024

## 2024-04-17 LAB — PRENATAL STREP B CULTURE: NEGATIVE

## 2024-04-20 ENCOUNTER — HOSPITAL ENCOUNTER (INPATIENT)
Facility: HOSPITAL | Age: 29
LOS: 2 days | Discharge: HOME | End: 2024-04-22
Attending: SPECIALIST | Admitting: OBSTETRICS & GYNECOLOGY
Payer: MEDICAID

## 2024-04-20 PROBLEM — B00.9 HSV INFECTION: Status: ACTIVE | Noted: 2024-04-20

## 2024-04-20 LAB
ABO + RH BLD: NORMAL
ALBUMIN SERPL BCP-MCNC: 3.2 G/DL (ref 3.5–5.2)
ALP SERPL-CCNC: 143 U/L (ref 55–135)
ALT SERPL W/O P-5'-P-CCNC: 7 U/L (ref 10–44)
AMPHET+METHAMPHET UR QL: NEGATIVE
ANION GAP SERPL CALC-SCNC: 6 MMOL/L (ref 8–16)
AST SERPL-CCNC: 14 U/L (ref 10–40)
BARBITURATES UR QL SCN>200 NG/ML: NEGATIVE
BASOPHILS # BLD AUTO: 0.02 K/UL (ref 0–0.2)
BASOPHILS NFR BLD: 0.2 % (ref 0–1.9)
BENZODIAZ UR QL SCN>200 NG/ML: NEGATIVE
BILIRUB SERPL-MCNC: 0.5 MG/DL (ref 0.1–1)
BLD GP AB SCN CELLS X3 SERPL QL: NORMAL
BUN SERPL-MCNC: 4 MG/DL (ref 6–20)
BUPRENORPHINE UR QL: NEGATIVE
BZE UR QL SCN: NEGATIVE
CALCIUM SERPL-MCNC: 8.8 MG/DL (ref 8.7–10.5)
CANNABINOIDS UR QL SCN: ABNORMAL
CHLORIDE SERPL-SCNC: 101 MMOL/L (ref 95–110)
CO2 SERPL-SCNC: 29 MMOL/L (ref 23–29)
CREAT SERPL-MCNC: 0.6 MG/DL (ref 0.5–1.4)
CREAT UR-MCNC: 149 MG/DL (ref 15–325)
DIFFERENTIAL METHOD BLD: ABNORMAL
EOSINOPHIL # BLD AUTO: 0 K/UL (ref 0–0.5)
EOSINOPHIL NFR BLD: 0.2 % (ref 0–8)
ERYTHROCYTE [DISTWIDTH] IN BLOOD BY AUTOMATED COUNT: 14.6 % (ref 11.5–14.5)
EST. GFR  (NO RACE VARIABLE): >60 ML/MIN/1.73 M^2
FENTANYL UR QL SCN: NORMAL
GLUCOSE SERPL-MCNC: 111 MG/DL (ref 70–110)
HCT VFR BLD AUTO: 34.8 % (ref 37–48.5)
HGB BLD-MCNC: 10.8 G/DL (ref 12–16)
IMM GRANULOCYTES # BLD AUTO: 0.09 K/UL (ref 0–0.04)
IMM GRANULOCYTES NFR BLD AUTO: 0.7 % (ref 0–0.5)
LYMPHOCYTES # BLD AUTO: 1.6 K/UL (ref 1–4.8)
LYMPHOCYTES NFR BLD: 12 % (ref 18–48)
MCH RBC QN AUTO: 24.4 PG (ref 27–31)
MCHC RBC AUTO-ENTMCNC: 31 G/DL (ref 32–36)
MCV RBC AUTO: 79 FL (ref 82–98)
MONOCYTES # BLD AUTO: 0.8 K/UL (ref 0.3–1)
MONOCYTES NFR BLD: 5.9 % (ref 4–15)
NEUTROPHILS # BLD AUTO: 10.6 K/UL (ref 1.8–7.7)
NEUTROPHILS NFR BLD: 81 % (ref 38–73)
NRBC BLD-RTO: 0 /100 WBC
OPIATES UR QL SCN: NEGATIVE
PCP UR QL SCN>25 NG/ML: NEGATIVE
PLATELET # BLD AUTO: 198 K/UL (ref 150–450)
PMV BLD AUTO: 10.7 FL (ref 9.2–12.9)
POTASSIUM SERPL-SCNC: 3.3 MMOL/L (ref 3.5–5.1)
PROT SERPL-MCNC: 6 G/DL (ref 6–8.4)
RBC # BLD AUTO: 4.42 M/UL (ref 4–5.4)
SODIUM SERPL-SCNC: 136 MMOL/L (ref 136–145)
TOXICOLOGY INFORMATION: ABNORMAL
TREPONEMA PALLIDUM IGG+IGM AB [PRESENCE] IN SERUM OR PLASMA BY IMMUNOASSAY: NONREACTIVE
WBC # BLD AUTO: 13.07 K/UL (ref 3.9–12.7)

## 2024-04-20 PROCEDURE — 63600175 PHARM REV CODE 636 W HCPCS: Performed by: OBSTETRICS & GYNECOLOGY

## 2024-04-20 PROCEDURE — 80354 DRUG SCREENING FENTANYL: CPT | Performed by: OBSTETRICS & GYNECOLOGY

## 2024-04-20 PROCEDURE — 85025 COMPLETE CBC W/AUTO DIFF WBC: CPT | Performed by: SPECIALIST

## 2024-04-20 PROCEDURE — 72200004 HC VAGINAL DELIVERY LEVEL I

## 2024-04-20 PROCEDURE — 36415 COLL VENOUS BLD VENIPUNCTURE: CPT | Performed by: OBSTETRICS & GYNECOLOGY

## 2024-04-20 PROCEDURE — 80053 COMPREHEN METABOLIC PANEL: CPT | Performed by: OBSTETRICS & GYNECOLOGY

## 2024-04-20 PROCEDURE — 86850 RBC ANTIBODY SCREEN: CPT | Performed by: SPECIALIST

## 2024-04-20 PROCEDURE — 80307 DRUG TEST PRSMV CHEM ANLYZR: CPT | Performed by: SPECIALIST

## 2024-04-20 PROCEDURE — 36415 COLL VENOUS BLD VENIPUNCTURE: CPT | Performed by: SPECIALIST

## 2024-04-20 PROCEDURE — 25000003 PHARM REV CODE 250: Performed by: OBSTETRICS & GYNECOLOGY

## 2024-04-20 PROCEDURE — 86593 SYPHILIS TEST NON-TREP QUANT: CPT | Performed by: SPECIALIST

## 2024-04-20 PROCEDURE — 12000002 HC ACUTE/MED SURGE SEMI-PRIVATE ROOM

## 2024-04-20 PROCEDURE — 80348 DRUG SCREENING BUPRENORPHINE: CPT | Performed by: SPECIALIST

## 2024-04-20 RX ORDER — NIFEDIPINE 10 MG/1
10 CAPSULE ORAL ONCE
Status: COMPLETED | OUTPATIENT
Start: 2024-04-20 | End: 2024-04-20

## 2024-04-20 RX ORDER — OXYTOCIN/RINGER'S LACTATE 30/500 ML
334 PLASTIC BAG, INJECTION (ML) INTRAVENOUS ONCE
Status: COMPLETED | OUTPATIENT
Start: 2024-04-20 | End: 2024-04-20

## 2024-04-20 RX ORDER — MISOPROSTOL 200 UG/1
400 TABLET ORAL ONCE
Status: COMPLETED | OUTPATIENT
Start: 2024-04-20 | End: 2024-04-20

## 2024-04-20 RX ORDER — MISOPROSTOL 200 UG/1
800 TABLET ORAL ONCE AS NEEDED
Status: DISCONTINUED | OUTPATIENT
Start: 2024-04-20 | End: 2024-04-21

## 2024-04-20 RX ORDER — HYDROCODONE BITARTRATE AND ACETAMINOPHEN 5; 325 MG/1; MG/1
1 TABLET ORAL EVERY 4 HOURS PRN
Status: DISCONTINUED | OUTPATIENT
Start: 2024-04-20 | End: 2024-04-22 | Stop reason: HOSPADM

## 2024-04-20 RX ORDER — NIFEDIPINE 10 MG/1
10 CAPSULE ORAL EVERY 6 HOURS
Status: DISCONTINUED | OUTPATIENT
Start: 2024-04-21 | End: 2024-04-22 | Stop reason: HOSPADM

## 2024-04-20 RX ORDER — HYDROCODONE BITARTRATE AND ACETAMINOPHEN 10; 325 MG/1; MG/1
1 TABLET ORAL EVERY 4 HOURS PRN
Status: DISCONTINUED | OUTPATIENT
Start: 2024-04-20 | End: 2024-04-22 | Stop reason: HOSPADM

## 2024-04-20 RX ORDER — IBUPROFEN 400 MG/1
800 TABLET ORAL EVERY 6 HOURS PRN
Status: DISCONTINUED | OUTPATIENT
Start: 2024-04-20 | End: 2024-04-21

## 2024-04-20 RX ORDER — CALCIUM CARBONATE 200(500)MG
500 TABLET,CHEWABLE ORAL 3 TIMES DAILY PRN
Status: DISCONTINUED | OUTPATIENT
Start: 2024-04-20 | End: 2024-04-21

## 2024-04-20 RX ORDER — LIDOCAINE HYDROCHLORIDE 10 MG/ML
10 INJECTION INFILTRATION; PERINEURAL ONCE AS NEEDED
Status: DISCONTINUED | OUTPATIENT
Start: 2024-04-20 | End: 2024-04-21

## 2024-04-20 RX ORDER — CARBOPROST TROMETHAMINE 250 UG/ML
250 INJECTION, SOLUTION INTRAMUSCULAR
Status: DISCONTINUED | OUTPATIENT
Start: 2024-04-20 | End: 2024-04-21

## 2024-04-20 RX ORDER — OXYTOCIN/RINGER'S LACTATE 30/500 ML
95 PLASTIC BAG, INJECTION (ML) INTRAVENOUS ONCE AS NEEDED
Status: DISCONTINUED | OUTPATIENT
Start: 2024-04-20 | End: 2024-04-21

## 2024-04-20 RX ORDER — TRANEXAMIC ACID 10 MG/ML
1000 INJECTION, SOLUTION INTRAVENOUS EVERY 30 MIN PRN
Status: DISCONTINUED | OUTPATIENT
Start: 2024-04-20 | End: 2024-04-21

## 2024-04-20 RX ORDER — DIPHENOXYLATE HYDROCHLORIDE AND ATROPINE SULFATE 2.5; .025 MG/1; MG/1
2 TABLET ORAL EVERY 6 HOURS PRN
Status: DISCONTINUED | OUTPATIENT
Start: 2024-04-20 | End: 2024-04-21

## 2024-04-20 RX ORDER — METHYLERGONOVINE MALEATE 0.2 MG/ML
200 INJECTION INTRAVENOUS ONCE AS NEEDED
Status: DISCONTINUED | OUTPATIENT
Start: 2024-04-20 | End: 2024-04-21

## 2024-04-20 RX ORDER — OXYTOCIN/RINGER'S LACTATE 30/500 ML
334 PLASTIC BAG, INJECTION (ML) INTRAVENOUS ONCE AS NEEDED
Status: DISCONTINUED | OUTPATIENT
Start: 2024-04-20 | End: 2024-04-21

## 2024-04-20 RX ORDER — IBUPROFEN 400 MG/1
800 TABLET ORAL EVERY 6 HOURS PRN
Status: DISCONTINUED | OUTPATIENT
Start: 2024-04-20 | End: 2024-04-20

## 2024-04-20 RX ORDER — SODIUM CHLORIDE, SODIUM LACTATE, POTASSIUM CHLORIDE, CALCIUM CHLORIDE 600; 310; 30; 20 MG/100ML; MG/100ML; MG/100ML; MG/100ML
INJECTION, SOLUTION INTRAVENOUS CONTINUOUS
Status: DISCONTINUED | OUTPATIENT
Start: 2024-04-20 | End: 2024-04-21

## 2024-04-20 RX ADMIN — NIFEDIPINE 10 MG: 10 CAPSULE ORAL at 11:04

## 2024-04-20 RX ADMIN — Medication 334 MILLI-UNITS/MIN: at 08:04

## 2024-04-20 RX ADMIN — IBUPROFEN 800 MG: 400 TABLET ORAL at 09:04

## 2024-04-20 RX ADMIN — IBUPROFEN 800 MG: 400 TABLET ORAL at 10:04

## 2024-04-20 RX ADMIN — NIFEDIPINE 10 MG: 10 CAPSULE ORAL at 07:04

## 2024-04-20 RX ADMIN — MISOPROSTOL 400 MCG: 200 TABLET ORAL at 08:04

## 2024-04-20 RX ADMIN — SODIUM CHLORIDE, POTASSIUM CHLORIDE, SODIUM LACTATE AND CALCIUM CHLORIDE: 600; 310; 30; 20 INJECTION, SOLUTION INTRAVENOUS at 08:04

## 2024-04-20 RX ADMIN — HYDROCODONE BITARTRATE AND ACETAMINOPHEN 1 TABLET: 10; 325 TABLET ORAL at 05:04

## 2024-04-20 RX ADMIN — HYDROCODONE BITARTRATE AND ACETAMINOPHEN 1 TABLET: 10; 325 TABLET ORAL at 11:04

## 2024-04-20 RX ADMIN — HYDROCODONE BITARTRATE AND ACETAMINOPHEN 1 TABLET: 10; 325 TABLET ORAL at 10:04

## 2024-04-20 RX ADMIN — NIFEDIPINE 10 MG: 10 CAPSULE ORAL at 10:04

## 2024-04-20 NOTE — NURSING
Nurses Note -- 4 Eyes      4/20/2024   9:19 AM      Skin assessed during: Admit      [x] No Altered Skin Integrity Present    []Prevention Measures Documented      [] Yes- Altered Skin Integrity Present or Discovered   [] LDA Added if Not in Epic (Describe Wound)   [] New Altered Skin Integrity was Present on Admit and Documented in LDA   [] Wound Image Taken    Wound Care Consulted? No    Attending Nurse:  Eileen Kincaid RN/Staff Member:  Linda Bailon RN

## 2024-04-20 NOTE — NURSING
OBGYN LABS ENTERED INTO RESULTS CONSOLE      1st Trimester Labs Entered Into Results Console     [] AOBRH   [] Rubella Immune   [] RPR   [] HBsAg   [] HIV   [x] Chlamydia   [x] Gonorrhea   [] Cell-Free DNA   [] Hep-C   [] Varicella    2nd Trimester Labs Entered Into Results Console     []OB Glucose Screen      3rd Trimester Labs Entered Into Results Console      [x] GBS   [] RPR    Drug Screen Entered Into Results Console     [] Benzodiazes   [] Methadone   [] Cocaine (Metab)   [] Opiate   [] Amphetamine   [x] Marijuana   [] Creatinine   [] Amphetamines-Beaker   [] Barbituates-Beaker   [] Benzodiazepine-Beaker   [] Cannabinoids-Beaker   [] Cocaine-Beaker   [] Fentanyl-Beaker   [] MDMA-Beaker   [] Opiates-Beaker   [] Phencyclidine-Beaker        Results Entered by Linda Bailon RN    Results Verified for Manual Entry Accuracy by Eileen Huddleston RN

## 2024-04-20 NOTE — NURSING
Dr. Russo on floor at 1100. Notified of Pt's blood pressures being 170s-180s/110s. Procardia given once at 1045 with minimal effect, then given again at 1118. Pt's trends now consisting of 120s-130s/ 70s-80s post giving procardia PO. Orders received to give procardia 10mg PO PRN if Blood pressures reach 150s/100s. Order received to notify physician on call as well if blood pressures reach these levels again.

## 2024-04-20 NOTE — NURSING
1854--Dr Russo informed bp up again in ldr 2, 183/92, 169/96 and about to give the 10mg procardia.

## 2024-04-20 NOTE — HOSPITAL COURSE
G4now P4 delivered precipitously at 36.5w in ER, 36.5w.  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  Neg GBS.

## 2024-04-21 LAB
BASOPHILS # BLD AUTO: 0.04 K/UL (ref 0–0.2)
BASOPHILS NFR BLD: 0.2 % (ref 0–1.9)
DIFFERENTIAL METHOD BLD: ABNORMAL
EOSINOPHIL # BLD AUTO: 0.1 K/UL (ref 0–0.5)
EOSINOPHIL NFR BLD: 0.4 % (ref 0–8)
ERYTHROCYTE [DISTWIDTH] IN BLOOD BY AUTOMATED COUNT: 14.6 % (ref 11.5–14.5)
HCT VFR BLD AUTO: 31.2 % (ref 37–48.5)
HGB BLD-MCNC: 9.9 G/DL (ref 12–16)
IMM GRANULOCYTES # BLD AUTO: 0.09 K/UL (ref 0–0.04)
IMM GRANULOCYTES NFR BLD AUTO: 0.5 % (ref 0–0.5)
LYMPHOCYTES # BLD AUTO: 3.2 K/UL (ref 1–4.8)
LYMPHOCYTES NFR BLD: 18.2 % (ref 18–48)
MCH RBC QN AUTO: 24.7 PG (ref 27–31)
MCHC RBC AUTO-ENTMCNC: 31.7 G/DL (ref 32–36)
MCV RBC AUTO: 78 FL (ref 82–98)
MONOCYTES # BLD AUTO: 0.9 K/UL (ref 0.3–1)
MONOCYTES NFR BLD: 5 % (ref 4–15)
NEUTROPHILS # BLD AUTO: 13.2 K/UL (ref 1.8–7.7)
NEUTROPHILS NFR BLD: 75.7 % (ref 38–73)
NRBC BLD-RTO: 0 /100 WBC
PLATELET # BLD AUTO: 271 K/UL (ref 150–450)
PLATELET BLD QL SMEAR: ABNORMAL
PMV BLD AUTO: 11.2 FL (ref 9.2–12.9)
RBC # BLD AUTO: 4.01 M/UL (ref 4–5.4)
WBC # BLD AUTO: 17.49 K/UL (ref 3.9–12.7)

## 2024-04-21 PROCEDURE — 25000003 PHARM REV CODE 250: Performed by: OBSTETRICS & GYNECOLOGY

## 2024-04-21 PROCEDURE — 25000003 PHARM REV CODE 250: Performed by: SPECIALIST

## 2024-04-21 PROCEDURE — 85025 COMPLETE CBC W/AUTO DIFF WBC: CPT | Performed by: SPECIALIST

## 2024-04-21 PROCEDURE — 12000002 HC ACUTE/MED SURGE SEMI-PRIVATE ROOM

## 2024-04-21 PROCEDURE — 36415 COLL VENOUS BLD VENIPUNCTURE: CPT | Performed by: SPECIALIST

## 2024-04-21 RX ORDER — MISOPROSTOL 200 UG/1
800 TABLET ORAL ONCE AS NEEDED
Status: DISCONTINUED | OUTPATIENT
Start: 2024-04-21 | End: 2024-04-22 | Stop reason: HOSPADM

## 2024-04-21 RX ORDER — TRANEXAMIC ACID 10 MG/ML
1000 INJECTION, SOLUTION INTRAVENOUS EVERY 30 MIN PRN
Status: DISCONTINUED | OUTPATIENT
Start: 2024-04-21 | End: 2024-04-22 | Stop reason: HOSPADM

## 2024-04-21 RX ORDER — OXYTOCIN/RINGER'S LACTATE 30/500 ML
95 PLASTIC BAG, INJECTION (ML) INTRAVENOUS ONCE AS NEEDED
Status: DISCONTINUED | OUTPATIENT
Start: 2024-04-21 | End: 2024-04-22 | Stop reason: HOSPADM

## 2024-04-21 RX ORDER — ACETAMINOPHEN 325 MG/1
650 TABLET ORAL EVERY 6 HOURS PRN
Status: DISCONTINUED | OUTPATIENT
Start: 2024-04-21 | End: 2024-04-22 | Stop reason: HOSPADM

## 2024-04-21 RX ORDER — HYDROCORTISONE 25 MG/G
CREAM TOPICAL 3 TIMES DAILY PRN
Status: DISCONTINUED | OUTPATIENT
Start: 2024-04-21 | End: 2024-04-22 | Stop reason: HOSPADM

## 2024-04-21 RX ORDER — ONDANSETRON 4 MG/1
8 TABLET, ORALLY DISINTEGRATING ORAL EVERY 8 HOURS PRN
Status: DISCONTINUED | OUTPATIENT
Start: 2024-04-21 | End: 2024-04-22 | Stop reason: HOSPADM

## 2024-04-21 RX ORDER — DIPHENOXYLATE HYDROCHLORIDE AND ATROPINE SULFATE 2.5; .025 MG/1; MG/1
2 TABLET ORAL EVERY 6 HOURS PRN
Status: DISCONTINUED | OUTPATIENT
Start: 2024-04-21 | End: 2024-04-22 | Stop reason: HOSPADM

## 2024-04-21 RX ORDER — DIPHENHYDRAMINE HYDROCHLORIDE 50 MG/ML
25 INJECTION INTRAMUSCULAR; INTRAVENOUS EVERY 4 HOURS PRN
Status: DISCONTINUED | OUTPATIENT
Start: 2024-04-21 | End: 2024-04-22 | Stop reason: HOSPADM

## 2024-04-21 RX ORDER — METHYLERGONOVINE MALEATE 0.2 MG/ML
200 INJECTION INTRAVENOUS ONCE AS NEEDED
Status: DISCONTINUED | OUTPATIENT
Start: 2024-04-21 | End: 2024-04-22 | Stop reason: HOSPADM

## 2024-04-21 RX ORDER — OXYTOCIN/RINGER'S LACTATE 30/500 ML
334 PLASTIC BAG, INJECTION (ML) INTRAVENOUS ONCE AS NEEDED
Status: DISCONTINUED | OUTPATIENT
Start: 2024-04-21 | End: 2024-04-22 | Stop reason: HOSPADM

## 2024-04-21 RX ORDER — OXYTOCIN/RINGER'S LACTATE 30/500 ML
95 PLASTIC BAG, INJECTION (ML) INTRAVENOUS ONCE
Status: DISCONTINUED | OUTPATIENT
Start: 2024-04-21 | End: 2024-04-22 | Stop reason: HOSPADM

## 2024-04-21 RX ORDER — HYDROCODONE BITARTRATE AND ACETAMINOPHEN 5; 325 MG/1; MG/1
1 TABLET ORAL EVERY 6 HOURS PRN
Qty: 10 TABLET | Refills: 0 | Status: SHIPPED | OUTPATIENT
Start: 2024-04-21 | End: 2024-06-19 | Stop reason: CLARIF

## 2024-04-21 RX ORDER — CARBOPROST TROMETHAMINE 250 UG/ML
250 INJECTION, SOLUTION INTRAMUSCULAR
Status: DISCONTINUED | OUTPATIENT
Start: 2024-04-21 | End: 2024-04-22 | Stop reason: HOSPADM

## 2024-04-21 RX ORDER — OXYTOCIN 10 [USP'U]/ML
10 INJECTION, SOLUTION INTRAMUSCULAR; INTRAVENOUS ONCE AS NEEDED
Status: DISCONTINUED | OUTPATIENT
Start: 2024-04-21 | End: 2024-04-22 | Stop reason: HOSPADM

## 2024-04-21 RX ORDER — DIPHENHYDRAMINE HCL 25 MG
25 CAPSULE ORAL EVERY 4 HOURS PRN
Status: DISCONTINUED | OUTPATIENT
Start: 2024-04-21 | End: 2024-04-22 | Stop reason: HOSPADM

## 2024-04-21 RX ORDER — SODIUM CHLORIDE 0.9 % (FLUSH) 0.9 %
10 SYRINGE (ML) INJECTION
Status: DISCONTINUED | OUTPATIENT
Start: 2024-04-21 | End: 2024-04-22 | Stop reason: HOSPADM

## 2024-04-21 RX ORDER — DOCUSATE SODIUM 100 MG/1
200 CAPSULE, LIQUID FILLED ORAL 2 TIMES DAILY PRN
Status: DISCONTINUED | OUTPATIENT
Start: 2024-04-21 | End: 2024-04-22 | Stop reason: HOSPADM

## 2024-04-21 RX ADMIN — NIFEDIPINE 10 MG: 10 CAPSULE ORAL at 05:04

## 2024-04-21 RX ADMIN — HYDROCODONE BITARTRATE AND ACETAMINOPHEN 1 TABLET: 5; 325 TABLET ORAL at 03:04

## 2024-04-21 RX ADMIN — NIFEDIPINE 10 MG: 10 CAPSULE ORAL at 12:04

## 2024-04-21 RX ADMIN — NIFEDIPINE 10 MG: 10 CAPSULE ORAL at 06:04

## 2024-04-21 RX ADMIN — NIFEDIPINE 10 MG: 10 CAPSULE ORAL at 11:04

## 2024-04-21 RX ADMIN — IBUPROFEN 600 MG: 400 TABLET ORAL at 07:04

## 2024-04-21 RX ADMIN — IBUPROFEN 600 MG: 400 TABLET ORAL at 11:04

## 2024-04-21 RX ADMIN — HYDROCODONE BITARTRATE AND ACETAMINOPHEN 1 TABLET: 10; 325 TABLET ORAL at 11:04

## 2024-04-21 NOTE — NURSING TRANSFER
Nursing Transfer Note      4/20/2024 20:10 PM    Nurse giving handoff:JOAQUIN Patrick RNC  Nurse receiving handoff:Alisa CHOI    Reason patient is being transferred:  care    Transfer To: 2102    Transfer via ambulatory per pt request, this RN accompanied      Transfer Vital Signs:  See flow sheet      4eyes on Skin: yes    Medicines sent: none    Any special needs or follow-up needed: n/a    Patient belongings transferred with patient: Yes    Chart send with patient: Yes    Notified: n/a    Patient reassessed at: see flow sheet (date, time)  1  Upon arrival to floor: patient oriented to room, call bell in reach, and bed in lowest position    Bedside handoff complete.

## 2024-04-21 NOTE — PLAN OF CARE
Assessment completed: at bedside with mother     Address mother and baby will discharge home to:[address] [place] [address]     History of Substance Abuse issues:  Mother denies                Assistive Treatment Programs or Medications?  Mother denies    History of Mental Health issues:  Mother denies    History of Domestic Violence:  Mother denies    Consult. Received consult for Pt's  UDS positive for prenatal and upon admit. Infant UDS is negative. Mother admitted before knowledge of infant she did smoke THC however stopped when she knew of pregnancy. Discussed positive drug screen for THC upon admission.  Mother was educated on implications, that meconium for baby was collected and will be tested, and report to DCFS will be made if results are positive for any illicit substances.  Mother verbalized understanding at this time.

## 2024-04-21 NOTE — PLAN OF CARE
Assessment completed: at bedside with mother      Address mother and baby will discharge home to:[address] [place] [address]      History of Substance Abuse issues:  Mother denies                 Assistive Treatment Programs or Medications?  Mother denies     History of Mental Health issues:  Mother denies     History of Domestic Violence:  Mother denies     Consult. Received consult for Pt's  UDS positive for prenatal and upon admit. Infant UDS is negative. Mother admitted before knowledge of infant she did smoke THC however stopped when she knew of pregnancy. Discussed positive drug screen for THC upon admission.  Mother was educated on implications, that meconium for baby was collected and will be tested, and report to DCFS will be made if results are positive for any illicit substances.  Mother verbalized understanding at this time         04/21/24 1320   OB Discharge Planning Assessment   Assessment Type Discharge Planning Assessment   Source of Information patient;health record   Verified Demographic and Insurance Information Yes   Insurance Medicaid   Medicaid Other (see comments)   Medicaid Insurance Primary   People in Home child(miranda), dependent   Name(s) of People in Home 3 minor children   Relationship Status None   Highest Level of Education Some High School   Father's Involvement None   Is Father signing the birth certificate No   Transportation Anticipated family or friend will provide   Receive [facility] Benefits Already certified, will apply for new born    Arrangements Self;Family   Adoption Planned no   Infant Feeding Plan formula feeding   Does baby have crib or safe sleep space? Yes   Do you have a car seat? Yes   Has other essential care items? Clothing;Bottles;Diapers   Equipment Currently Used at Home none   DME Needed Upon Discharge  none   DCFS No indications (Indicators for Report)  (awaiitng meconium)   Discharge Plan A Home with family   Discharge Plan B Home with family

## 2024-04-21 NOTE — SUBJECTIVE & OBJECTIVE
Interval History: PPD #1    Bottle feeding, bleeding light  No complaints    Objective:     Vital Signs (Most Recent):  Temp: 97.9 °F (36.6 °C) (04/21/24 0904)  Pulse: 75 (04/21/24 0904)  Resp: 17 (04/21/24 0904)  BP: 123/82 (04/21/24 0904)  SpO2: 100 % (04/21/24 0904) Vital Signs (24h Range):  Temp:  [97.8 °F (36.6 °C)-98.9 °F (37.2 °C)] 97.9 °F (36.6 °C)  Pulse:  [] 75  Resp:  [12-20] 17  SpO2:  [98 %-100 %] 100 %  BP: (123-186)/() 123/82     Weight: 79.6 kg (175 lb 7.8 oz)  Body mass index is 28.32 kg/m².      Intake/Output Summary (Last 24 hours) at 4/21/2024 1016  Last data filed at 4/21/2024 0000  Gross per 24 hour   Intake --   Output 1196 ml   Net -1196 ml         Significant Labs:  Lab Results   Component Value Date    GROUPTRH O POS 04/20/2024    HEPBSAG Negative 09/26/2023    STREPBCULT negative 04/17/2024     Recent Labs   Lab 04/21/24  0809   HGB 9.9*   HCT 31.2*       CBC:   Recent Labs   Lab 04/21/24  0809   WBC 17.49*   RBC 4.01   HGB 9.9*   HCT 31.2*      MCV 78*   MCH 24.7*   MCHC 31.7*     I have personallly reviewed all pertinent lab results from the last 24 hours.    Physical Exam    Review of Systems

## 2024-04-21 NOTE — NURSING
1854 Rcv'd report and assumed care.      1910 Procardia given as per order.  Pt denies neuro symptoms and assessment WNL.  Pt sitting up in bed holding infant.       1948  Updated Dr. Russo on blood pressure. New orders rcv'd.  States may transfer to  care.

## 2024-04-21 NOTE — PROGRESS NOTES
[facility]  Obstetrics  Postpartum Progress Note    Patient Name: Yvan Thurston  MRN: [id number]  Admission Date: 2024  Hospital Length of Stay: 1 days  Attending Physician: Ethan Quiles MD  Primary Care Provider: Mirian, Primary Doctor    Subjective:     Principal Problem:Precipitous delivery    Hospital Course:  G4now P4 delivered precipitously at 36.5w in ER, 36.5w.  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  Neg GBS.    Interval History: PPD #1    Bottle feeding, bleeding light  No complaints    Objective:     Vital Signs (Most Recent):  Temp: 97.9 °F (36.6 °C) (24)  Pulse: 75 (24)  Resp: 17 (24)  BP: 123/82 (24)  SpO2: 100 % (24) Vital Signs (24h Range):  Temp:  [97.8 °F (36.6 °C)-98.9 °F (37.2 °C)] 97.9 °F (36.6 °C)  Pulse:  [] 75  Resp:  [12-20] 17  SpO2:  [98 %-100 %] 100 %  BP: (123-186)/() 123/82     Weight: 79.6 kg (175 lb 7.8 oz)  Body mass index is 28.32 kg/m².      Intake/Output Summary (Last 24 hours) at 2024 1016  Last data filed at 2024 0000  Gross per 24 hour   Intake --   Output 1196 ml   Net -1196 ml         Significant Labs:  Lab Results   Component Value Date    GROUPTRH O POS 2024    HEPBSAG Negative 2023    STREPBCULT negative 2024     Recent Labs   Lab 24  0809   HGB 9.9*   HCT 31.2*       CBC:   Recent Labs   Lab 24  0809   WBC 17.49*   RBC 4.01   HGB 9.9*   HCT 31.2*      MCV 78*   MCH 24.7*   MCHC 31.7*     I have personallly reviewed all pertinent lab results from the last 24 hours.    Physical Exam    Review of Systems  Assessment/Plan:     29 y.o. female  for:    * Precipitous delivery  S/p  in the ER  GBBS negative  Bottle feeding          Disposition: As patient meets milestones, will plan to discharge tomorrow.    Ofelia Garcia MD  Obstetrics  Critical access hospital

## 2024-04-21 NOTE — PLAN OF CARE
VSS. Uterus firm w/o massage, bleeding continues to improve. Pt ambulating independently, voiding spontaneously. Pain managed adequately w/medication. Plan of care reviewed w/pt. No new concerns expressed at this time. Will continue to monitor appropriately.     -----------------------------  Problem: Adult Inpatient Plan of Care  Goal: Plan of Care Review  Outcome: Ongoing, Progressing  Goal: Patient-Specific Goal (Individualized)  Outcome: Ongoing, Progressing  Goal: Absence of Hospital-Acquired Illness or Injury  Outcome: Ongoing, Progressing  Goal: Optimal Comfort and Wellbeing  Outcome: Ongoing, Progressing  Goal: Readiness for Transition of Care  Outcome: Ongoing, Progressing     Problem:  Fall Injury Risk  Goal: Absence of Fall, Infant Drop and Related Injury  Outcome: Ongoing, Progressing     Problem: Infection  Goal: Absence of Infection Signs and Symptoms  Outcome: Ongoing, Progressing  Intervention: Prevent or Manage Infection  Flowsheets (Taken 2024)  Infection Management: aseptic technique maintained     Problem: Adjustment to Role Transition (Postpartum Vaginal Delivery)  Goal: Successful Maternal Role Transition  Outcome: Ongoing, Progressing  Intervention: Support Maternal Role Transition  Flowsheets (Taken 2024 010)  Supportive Measures:   self-reflection promoted   self-care encouraged   problem-solving facilitated   counseling provided   active listening utilized   positive reinforcement provided   self-responsibility promoted   verbalization of feelings encouraged   decision-making supported   goal-setting facilitated   relaxation techniques promoted  Parent/Child Attachment Promotion:   caring behavior modeled   participation in care promoted   cue recognition promoted   positive reinforcement provided   face-to-face positioning promoted   rooming-in promoted   interaction encouraged   skin-to-skin contact encouraged   parent/caregiver presence encouraged   strengths  emphasized     Problem: Bleeding (Postpartum Vaginal Delivery)  Goal: Hemostasis  Outcome: Ongoing, Progressing     Problem: Infection (Postpartum Vaginal Delivery)  Goal: Absence of Infection Signs/Symptoms  Outcome: Ongoing, Progressing  Intervention: Prevent or Manage Infection  Flowsheets (Taken 4/21/2024 0100)  Infection Management: aseptic technique maintained     Problem: Pain (Postpartum Vaginal Delivery)  Goal: Acceptable Pain Control  Outcome: Ongoing, Progressing  Intervention: Prevent or Manage Pain  Flowsheets (Taken 4/21/2024 0100)  Pain Management Interventions:   care clustered   pain management plan reviewed with patient/caregiver   quiet environment facilitated

## 2024-04-22 VITALS
TEMPERATURE: 98 F | HEIGHT: 66 IN | RESPIRATION RATE: 18 BRPM | HEART RATE: 73 BPM | BODY MASS INDEX: 28.21 KG/M2 | DIASTOLIC BLOOD PRESSURE: 74 MMHG | SYSTOLIC BLOOD PRESSURE: 131 MMHG | WEIGHT: 175.5 LBS | OXYGEN SATURATION: 100 %

## 2024-04-22 PROCEDURE — 25000003 PHARM REV CODE 250: Performed by: SPECIALIST

## 2024-04-22 PROCEDURE — 25000003 PHARM REV CODE 250: Performed by: OBSTETRICS & GYNECOLOGY

## 2024-04-22 RX ADMIN — IBUPROFEN 600 MG: 400 TABLET ORAL at 03:04

## 2024-04-22 RX ADMIN — NIFEDIPINE 10 MG: 10 CAPSULE ORAL at 05:04

## 2024-04-22 RX ADMIN — HYDROCODONE BITARTRATE AND ACETAMINOPHEN 1 TABLET: 10; 325 TABLET ORAL at 05:04

## 2024-04-22 RX ADMIN — HYDROCODONE BITARTRATE AND ACETAMINOPHEN 1 TABLET: 10; 325 TABLET ORAL at 12:04

## 2024-04-22 RX ADMIN — NIFEDIPINE 10 MG: 10 CAPSULE ORAL at 12:04

## 2024-04-22 NOTE — PLAN OF CARE
04/22/24 1455   Final Note   Assessment Type Final Discharge Note   Anticipated Discharge Disposition Home   What phone number can be called within the next 1-3 days to see how you are doing after discharge? [phone]   Post-Acute Status   Discharge Delays None known at this time     Discharge orders and chart reviewed with no further post-acute discharge needs identified at this time.  At this time, patient is cleared for discharge from Case Management standpoint.

## 2024-04-22 NOTE — SUBJECTIVE & OBJECTIVE
Interval History: ***    Scheduled Meds:  Current Facility-Administered Medications   Medication Dose Route Frequency    NIFEdipine  10 mg Oral Q6H    oxytocin in lactated ringers  95 shirley-units/min Intravenous Once     Continuous Infusions:  Current Facility-Administered Medications   Medication Dose Route Frequency Last Rate Last Admin     PRN Meds:  Current Facility-Administered Medications:     acetaminophen, 650 mg, Oral, Q6H PRN    benzocaine-lanolin, , Topical (Top), QID PRN    carboprost, 250 mcg, Intramuscular, Q15 Min PRN    diphenhydrAMINE, 25 mg, Oral, Q4H PRN    diphenhydrAMINE, 25 mg, Intravenous, Q4H PRN    diphenoxylate-atropine 2.5-0.025 mg, 2 tablet, Oral, Q6H PRN    docusate sodium, 200 mg, Oral, BID PRN    HYDROcodone-acetaminophen, 1 tablet, Oral, Q4H PRN    HYDROcodone-acetaminophen, 1 tablet, Oral, Q4H PRN    hydrocortisone, , Rectal, TID PRN    ibuprofen, 600 mg, Oral, Q6H PRN    lanolin, , Topical (Top), PRN    measles, mumps and rubella vaccine, 0.5 mL, Subcutaneous, vaccine x 1 dose    methylergonovine, 200 mcg, Intramuscular, Once PRN    miSOPROStoL, 800 mcg, Rectal, Once PRN    miSOPROStoL, 800 mcg, Oral, Once PRN    ondansetron, 8 mg, Oral, Q8H PRN    oxytocin in lactated ringers, 334 shirley-units/min, Intravenous, Once PRN    oxytocin in lactated ringers, 95 shirley-units/min, Intravenous, Once PRN    oxytocin, 10 Units, Intramuscular, Once PRN    sodium chloride 0.9%, 10 mL, Intravenous, PRN    DIPH,PERTUSS(ACELL),TET VACCINE (ADULT)(BOOSTRIX,ADACEL), 0.5 mL, Intramuscular, vaccine x 1 dose    tranexamic acid (CYKLOKAPRON) infusion, 1,000 mg, Intravenous, Q30 Min PRN    Review of Systems  Objective:     Vital Signs (Most Recent):  Temp: 98 °F (36.7 °C) (04/22/24 0826)  Pulse: 73 (04/22/24 0826)  Resp: 18 (04/22/24 0826)  BP: 131/74 (04/22/24 0826)  SpO2: 100 % (04/22/24 0826) Vital Signs (24h Range):  Temp:  [97.5 °F (36.4 °C)-98 °F (36.7 °C)] 98 °F (36.7 °C)  Pulse:  [] 73  Resp:  " [18] 18  SpO2:  [99 %-100 %] 100 %  BP: (131-146)/(74-95) 131/74     Patient Vitals for the past 72 hrs (Last 3 readings):   Weight   04/20/24 2020 24245 g (175 lb 7.8 oz)   04/20/24 0835 88984 g (188 lb)     Body mass index is 28.32 kg/m².    Intake/Output - Last 3 Shifts         04/20 0700 04/21 0659 04/21 0700 04/22 0659 04/22 0700  04/23 0659    Urine (mL/kg/hr) 1546      Blood 476      Total Output 2022      Net -2022             Urine Occurrence 3 x 6 x             Lines/Drains/Airways       None                      Physical Exam       Significant Labs:  No results for input(s): "POCTGLUCOSE" in the last 48 hours.    {Results:64113}    Significant Imaging: {Imaging Review:[id number]}  "

## 2024-04-22 NOTE — DISCHARGE INSTRUCTIONS
Pelvic rest for 6 weeks (no sex, tampons, douching, nothing in the vagina)    You can experience vaginal bleeding on and off for up to 6 weeks, it will gradually get lighter and the color will change from bright red to a brownish discharge towards the end.    Activity:  NO strenuous activities or exercising.  Do not /lift anything over 15 pounds.   Do not do heavy housework or cleaning.    NO driving for 3 days.  You may take short car trips but do not drive.    You may shower and/or soak in a bathtub, both are acceptable.  Use a mild soap, no heavy perfumes or fragrances to avoid irritation.     If constipation develops:  You may take Colace (stool softener), Milk of Magnesia, Dulcolax or Miralax.  All of these medications are sold over the counter.      Care of Episiotomy:  Local agents such as Tucks pads & Dermoplast spray.  You may also use a Sitz bath: sitting in a tub of warm water for 15 minutes 2-3 times per day will help relieve the discomfort.      Pain Relief:  You may take Motrin for mild pain & uterine cramping.      Emotional Changes:  You may experience baby blues after delivery.  You may feel let down, anxious and cry easily.  This is normal.  These feelings can begin 2-3 days after delivery and usually disappear in about a week or two.  Prolonged sadness may indicate postpartum depression.      Call your doctor for any of the following:  Difficulty breathing, problems with any of your medications, inability to eat.    Foul smelling vaginal discharge.  Temperature above 100.4.    Heavy vaginal bleeding.  All women bleed different after delivery and each delivery is different.  Heavy bleeding consists of saturating a isabella pad in a 1 hour time period.  Passing clots are normal, if you pass a blood clot larger than the size of a golf ball call your doctor's office.   If you experience pain in your legs/calves, if one leg increases in size and becomes swollen or becomes hot to touch or  discolored.   Crying or periods of sadness beyond 2 weeks.        If you are not breastfeeding:  Wear a tight bra and do not stimulate your breasts.  Avoid handling your breasts and do not express milk.  You may apply ice packs or cabbage leaves to relieve discomfort from engorgement.  If your breasts become warm to touch, reddened or lumps develop call your doctor.

## 2024-04-22 NOTE — DISCHARGE SUMMARY
"[facility]  Obstetrics  Discharge Summary      Patient Name: Yvan Thurston  MRN: [id number]  Admission Date: 2024  Hospital Length of Stay: 2 days  Discharge Date and Time:  2024 12:57 PM  Attending Physician: Ethan Quiles MD   Discharging Provider: Ethan Quiles MD   Primary Care Provider: No primary care provider on file.    HPI: No notes on file        * No surgery found *     Hospital Course:   G4now P4 delivered precipitously at 36.5w in ER, 36.5w.  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  Neg GBS.         Final Active Diagnoses:    Diagnosis Date Noted POA    PRINCIPAL PROBLEM:  Precipitous delivery [O62.3] 2024 Yes    HSV infection [B00.9] 2024 No      Problems Resolved During this Admission:        Significant Diagnostic Studies: Labs: CBC   Recent Labs   Lab 24  0809   WBC 17.49*   HGB 9.9*   HCT 31.2*        Lab Results   Component Value Date    GROUPTRH O POS 2024         Feeding Method: bottle    Immunizations       Date Immunization Status Dose Route/Site Given by    24 0540 MMR Incomplete 0.5 mL Subcutaneous/     24 0540 Tdap Incomplete 0.5 mL Intramuscular/             Delivery:    Episiotomy:     Lacerations: None   Repair suture:     Repair # of packets:     Blood loss (ml):       Birth information:  YOB: 2025   Time of birth: 7:40 AM   Sex: male   Delivery type: Vaginal, Spontaneous   Gestational Age: 36w5d     Measurements    Weight: 3000 g  Weight (lbs): 6 lb 9.8 oz  Length: 47.6 cm  Length (in): 18.75"         Delivery Clinician: Delivery Providers    Delivering clinician: Ernesto Colvin MD   Provider Role    Eileen Huddleston RN Registered Nurse    Vasile Ty RN Registered Nurse    Sabiha Joy NNP Nurse Practitioner             Additional  information:  Forceps:    Vacuum:    Breech:    Observed anomalies      Living?:     Apgars    Living status: Living  Apgar Component Scores:  1 min.:  " 5 min.:  10 min.:  15 min.:  20 min.:    Skin color:  1  1       Heart rate:  2  2       Reflex irritability:  2  2       Muscle tone:  2  2       Respiratory effort:  2  2       Total:  9  9       Apgars assigned by: GILDA CARLOS         Placenta: Delivered:       appearance  Pending Diagnostic Studies:       None            Discharged Condition: good    Disposition: Home or Self Care    Follow Up:    Patient Instructions:      Diet Adult Regular     Remove dressing in 24 hours     Pelvic Rest     Notify your health care provider if you experience any of the following:  temperature >100.4     Notify your health care provider if you experience any of the following:  persistent nausea and vomiting or diarrhea     Notify your health care provider if you experience any of the following:  severe uncontrolled pain     Notify your health care provider if you experience any of the following:  redness, tenderness, or signs of infection (pain, swelling, redness, odor or green/yellow discharge around incision site)     Activity as tolerated     Medications:  Current Discharge Medication List        START taking these medications    Details   HYDROcodone-acetaminophen (NORCO) 5-325 mg per tablet Take 1 tablet by mouth every 6 (six) hours as needed for Pain.  Qty: 10 tablet, Refills: 0    Comments: Quantity prescribed more than 7 day supply? No           CONTINUE these medications which have NOT CHANGED    Details   ondansetron (ZOFRAN-ODT) 4 MG TbDL Take 1 tablet (4 mg total) by mouth every 8 (eight) hours as needed (nausea/vomiting).  Qty: 12 tablet, Refills: 0             Ethan Quiles MD  Obstetrics  Critical [facility] hospital

## 2024-04-22 NOTE — PROGRESS NOTES
"                                                                                     Postpartum Day 2 Vaginal Delivery    Yvan Thurston is doing well without complaints. She denies any problems with pp blues, is ambulating well, is voiding without difficulty, and states bleeding is not heavy. Her pain level is being controlled with pain meds.      OBJECTIVE:     Vital Signs (Most Recent):  /74 (BP Location: Right arm, Patient Position: Sitting)   Pulse 73   Temp 98 °F (36.7 °C) (Oral)   Resp 18   Ht 5' 6" (1.676 m)   Wt 79.6 kg (175 lb 7.8 oz)   LMP 08/06/2023   SpO2 100%   Breastfeeding No   BMI 28.32 kg/m²       Vital Signs Range (Last 24H):  Reviewed    I & O (Last 24H):  Intake/Output Summary (Last 24 hours)  No intake or output data in the 24 hours ending 04/22/24 1254      Physical Exam:    Abd: soft non tender, fundus firm   Ext: negative watson's sign, minimal edema  Lochia: appropriate amount rubra      CBC:   Lab Results   Component Value Date/Time    WBC 17.49 (H) 04/21/2024 08:09 AM    RBC 4.01 04/21/2024 08:09 AM    HGB 9.9 (L) 04/21/2024 08:09 AM    HCT 31.2 (L) 04/21/2024 08:09 AM     04/21/2024 08:09 AM    MCV 78 (L) 04/21/2024 08:09 AM    MCH 24.7 (L) 04/21/2024 08:09 AM    MCHC 31.7 (L) 04/21/2024 08:09 AM       ABO/Rh  O POS     S/p vag delivery   Patient Active Problem List   Diagnosis    Precipitous delivery    HSV infection          D/c home  See discharge orders  Follow up 4-6 weeks  Emergency room precautions  "

## 2024-04-22 NOTE — NURSING
Discharge order received. Instructions with education printed and given to patient. Patient verbally educated on discharge instructions. All questions are answered and denies needs at this time.     Mom is rooming in with baby. Baby unable to be discharged today per peds MD.

## 2024-06-19 ENCOUNTER — HOSPITAL ENCOUNTER (OUTPATIENT)
Dept: PREADMISSION TESTING | Facility: HOSPITAL | Age: 29
Discharge: HOME | End: 2024-06-19
Attending: SPECIALIST
Payer: MEDICAID

## 2024-06-19 VITALS
DIASTOLIC BLOOD PRESSURE: 74 MMHG | HEIGHT: 66 IN | HEART RATE: 84 BPM | OXYGEN SATURATION: 98 % | TEMPERATURE: 98 F | SYSTOLIC BLOOD PRESSURE: 124 MMHG | RESPIRATION RATE: 14 BRPM | BODY MASS INDEX: 27.32 KG/M2 | WEIGHT: 170 LBS

## 2024-06-19 DIAGNOSIS — Z01.818 PRE-OP TESTING: Primary | ICD-10-CM

## 2024-06-19 RX ORDER — CEFAZOLIN SODIUM 1 G/50ML
1 SOLUTION INTRAVENOUS ONCE
OUTPATIENT
Start: 2024-06-21

## 2024-06-19 NOTE — PLAN OF CARE
Spoke to Pat at Dr Quiles office, reported abnl urine results , she states she will let Dr Quiles look at u/a results and call us with any new orders, however she thinks the pre op ancef will cover the UTI. No new orders given to me at this time

## 2024-06-19 NOTE — DISCHARGE INSTRUCTIONS
To confirm, Your doctor has instructed you that surgery is scheduled for:     Pre-Op will call the afternoon prior to surgery between 4:00 and 6:00 PM with the final arrival time.      Please report to Outpatient [place] via [facility] entrance. Check in at registration desk.    Do not eat or drink anything after midnight the night before your surgery - THIS INCLUDES  WATER, GUM, MINTS AND CANDY.  YOU MAY BRUSH YOUR TEETH BUT DO NOT SWALLOW     TAKE ONLY THESE MEDICATIONS WITH A SMALL SIP OF WATER THE MORNING OF YOUR PROCEDURE:      ONLY if you are diabetic, check your sugar in the morning before your procedure.       Do not take any diabetic medicines or insulin the morning of surgery .     PLEASE NOTE:  The surgery schedule has many variables which may affect the time of your surgery case.  Family members should be available if your surgery time changes.  Plan to be here the day of your procedure between 4-6 hours.      DO NOT TAKE THESE MEDICATIONS 5-7 DAYS PRIOR to your procedure or per your surgeon's request: ASPIRIN, ALEVE, ADVIL, IBUPROFEN,  EDWIN SELTZER, BC , FISH OIL , VITAMIN E, HERBALS  (May take Tylenol)      ONLY if you are prescribed any types of blood thinners such as:  Aspirin, Coumadin, Plavix, Pradaxa, Xarelto, Aggrenox, Effient, Eliquis, Savasya, Brilinta, or any other, ask your surgeon whether you should stop taking them and how long before surgery you should stop.  You may also need to verify with the prescribing physician if it is ok to stop your medication.                                                        IMPORTANT INSTRUCTIONS      Do not smoke, vape or drink alcoholic beverages 24 hours prior to your procedure.  Shower the night before AND the morning of your procedure with a Chlorhexidine wash such as Hibiclens or Dial antibacterial soap from the neck down. Do not apply any deodorants, lotions or powders after each shower.  Do not get it on your face or in your eyes.  You may use  your own shampoo and face wash. This helps your skin to be as bacteria free as possible.  DO NOT remove hair from the surgery site.  Do not shave the incision site unless you are given specific instructions to do so.    Sleep in a bed with clean sheets.  Do not sleep with a pet in the bed.   If you wear contact lenses, dentures, hearing aids or glasses, bring a container to put them in during surgery and give to a family member for safe keeping.    Please leave all jewelry, piercing's and valuables at home.   ONLY if you have been diagnosed with sleep apnea please bring your C-PAP machine.  ONLY if you wear home oxygen please bring your portable oxygen tank the day of your procedure.   ONLY for patients requiring bowel prep, written instructions will be given by your doctor's office.  ONLY if you have a neuro stimulator, please bring the controller with you the morning of surgery.    If your doctor has scheduled you for an overnight stay, bring a small overnight bag with any personal items you need.    Make arrangements in advance for transportation home by a responsible adult.      You must make arrangements for transportation, TAXI'S, UBER'S OR LYFTS ARE NOT ALLOWED.        If you have any questions about these instructions, call Pre-Op Admit  Nursing at [phone] or the Pre-Op Day Surgery Unit at [phone].    To confirm, Your doctor has instructed you that surgery is scheduled for:     Pre-Op will call the afternoon prior to surgery between 4:00 and 6:00 PM with the final arrival time.      Please report to Outpatient [place] via [facility] entrance. Check in at registration desk.    Do not eat or drink anything after midnight the night before your surgery - THIS INCLUDES  WATER, GUM, MINTS AND CANDY.  YOU MAY BRUSH YOUR TEETH BUT DO NOT SWALLOW     TAKE ONLY THESE MEDICATIONS WITH A SMALL SIP OF WATER THE MORNING OF YOUR PROCEDURE:      ONLY if you are diabetic, check your sugar in the morning before  your procedure.       Do not take any diabetic medicines or insulin the morning of surgery .     PLEASE NOTE:  The surgery schedule has many variables which may affect the time of your surgery case.  Family members should be available if your surgery time changes.  Plan to be here the day of your procedure between 4-6 hours.      DO NOT TAKE THESE MEDICATIONS 5-7 DAYS PRIOR to your procedure or per your surgeon's request: ASPIRIN, ALEVE, ADVIL, IBUPROFEN,  EDWIN SELTZER, BC , FISH OIL , VITAMIN E, HERBALS  (May take Tylenol)      ONLY if you are prescribed any types of blood thinners such as:  Aspirin, Coumadin, Plavix, Pradaxa, Xarelto, Aggrenox, Effient, Eliquis, Savasya, Brilinta, or any other, ask your surgeon whether you should stop taking them and how long before surgery you should stop.  You may also need to verify with the prescribing physician if it is ok to stop your medication.                                                        IMPORTANT INSTRUCTIONS      Do not smoke, vape or drink alcoholic beverages 24 hours prior to your procedure.  Shower the night before AND the morning of your procedure with a Chlorhexidine wash such as Hibiclens or Dial antibacterial soap from the neck down. Do not apply any deodorants, lotions or powders after each shower.  Do not get it on your face or in your eyes.  You may use your own shampoo and face wash. This helps your skin to be as bacteria free as possible.  DO NOT remove hair from the surgery site.  Do not shave the incision site unless you are given specific instructions to do so.    Sleep in a bed with clean sheets.  Do not sleep with a pet in the bed.   If you wear contact lenses, dentures, hearing aids or glasses, bring a container to put them in during surgery and give to a family member for safe keeping.    Please leave all jewelry, piercing's and valuables at home.   ONLY if you have been diagnosed with sleep apnea please bring your C-PAP machine.  ONLY if  you wear home oxygen please bring your portable oxygen tank the day of your procedure.   ONLY for patients requiring bowel prep, written instructions will be given by your doctor's office.  ONLY if you have a neuro stimulator, please bring the controller with you the morning of surgery.    If your doctor has scheduled you for an overnight stay, bring a small overnight bag with any personal items you need.    Make arrangements in advance for transportation home by a responsible adult.      You must make arrangements for transportation, TAXI'S, UBER'S OR LYFTS ARE NOT ALLOWED.        If you have any questions about these instructions, call Pre-Op Admit  Nursing at [phone] or the Pre-Op Day Surgery Unit at [phone].

## 2024-06-21 ENCOUNTER — ANESTHESIA EVENT (OUTPATIENT)
Dept: SURGERY | Facility: HOSPITAL | Age: 29
End: 2024-06-21
Payer: MEDICAID

## 2024-06-21 ENCOUNTER — ANESTHESIA (OUTPATIENT)
Dept: SURGERY | Facility: HOSPITAL | Age: 29
End: 2024-06-21
Payer: MEDICAID

## 2024-06-21 ENCOUNTER — HOSPITAL ENCOUNTER (OUTPATIENT)
Facility: HOSPITAL | Age: 29
Discharge: HOME | End: 2024-06-21
Attending: SPECIALIST | Admitting: SPECIALIST
Payer: MEDICAID

## 2024-06-21 VITALS
SYSTOLIC BLOOD PRESSURE: 124 MMHG | DIASTOLIC BLOOD PRESSURE: 86 MMHG | RESPIRATION RATE: 14 BRPM | TEMPERATURE: 98 F | BODY MASS INDEX: 27.44 KG/M2 | OXYGEN SATURATION: 100 % | WEIGHT: 170 LBS | HEART RATE: 72 BPM

## 2024-06-21 DIAGNOSIS — Z30.2 ENCOUNTER FOR TUBAL LIGATION: Primary | ICD-10-CM

## 2024-06-21 PROCEDURE — 36000709 HC OR TIME LEV III EA ADD 15 MIN: Performed by: SPECIALIST

## 2024-06-21 PROCEDURE — 63600175 PHARM REV CODE 636 W HCPCS: Performed by: ANESTHESIOLOGY

## 2024-06-21 PROCEDURE — 71000015 HC POSTOP RECOV 1ST HR: Performed by: SPECIALIST

## 2024-06-21 PROCEDURE — 63600175 PHARM REV CODE 636 W HCPCS: Performed by: NURSE ANESTHETIST, CERTIFIED REGISTERED

## 2024-06-21 PROCEDURE — 71000033 HC RECOVERY, INTIAL HOUR: Performed by: SPECIALIST

## 2024-06-21 PROCEDURE — 37000009 HC ANESTHESIA EA ADD 15 MINS: Performed by: SPECIALIST

## 2024-06-21 PROCEDURE — 36000708 HC OR TIME LEV III 1ST 15 MIN: Performed by: SPECIALIST

## 2024-06-21 PROCEDURE — 25000003 PHARM REV CODE 250: Performed by: ANESTHESIOLOGY

## 2024-06-21 PROCEDURE — 37000008 HC ANESTHESIA 1ST 15 MINUTES: Performed by: SPECIALIST

## 2024-06-21 PROCEDURE — 25000003 PHARM REV CODE 250: Performed by: NURSE ANESTHETIST, CERTIFIED REGISTERED

## 2024-06-21 PROCEDURE — 71000016 HC POSTOP RECOV ADDL HR: Performed by: SPECIALIST

## 2024-06-21 PROCEDURE — 27201423 OPTIME MED/SURG SUP & DEVICES STERILE SUPPLY: Performed by: SPECIALIST

## 2024-06-21 PROCEDURE — 71000039 HC RECOVERY, EACH ADD'L HOUR: Performed by: SPECIALIST

## 2024-06-21 PROCEDURE — 25000003 PHARM REV CODE 250: Performed by: SPECIALIST

## 2024-06-21 RX ORDER — MIDAZOLAM HYDROCHLORIDE 1 MG/ML
INJECTION INTRAMUSCULAR; INTRAVENOUS
Status: DISCONTINUED | OUTPATIENT
Start: 2024-06-21 | End: 2024-06-21

## 2024-06-21 RX ORDER — DEXAMETHASONE SODIUM PHOSPHATE 4 MG/ML
INJECTION, SOLUTION INTRA-ARTICULAR; INTRALESIONAL; INTRAMUSCULAR; INTRAVENOUS; SOFT TISSUE
Status: DISCONTINUED | OUTPATIENT
Start: 2024-06-21 | End: 2024-06-21

## 2024-06-21 RX ORDER — BUPIVACAINE HYDROCHLORIDE AND EPINEPHRINE 5; 5 MG/ML; UG/ML
INJECTION, SOLUTION EPIDURAL; INTRACAUDAL; PERINEURAL
Status: DISCONTINUED | OUTPATIENT
Start: 2024-06-21 | End: 2024-06-21 | Stop reason: HOSPADM

## 2024-06-21 RX ORDER — FENTANYL CITRATE 50 UG/ML
INJECTION, SOLUTION INTRAMUSCULAR; INTRAVENOUS
Status: DISCONTINUED | OUTPATIENT
Start: 2024-06-21 | End: 2024-06-21

## 2024-06-21 RX ORDER — PROPOFOL 10 MG/ML
VIAL (ML) INTRAVENOUS
Status: DISCONTINUED | OUTPATIENT
Start: 2024-06-21 | End: 2024-06-21

## 2024-06-21 RX ORDER — LABETALOL HYDROCHLORIDE 5 MG/ML
INJECTION, SOLUTION INTRAVENOUS
Status: DISCONTINUED | OUTPATIENT
Start: 2024-06-21 | End: 2024-06-21

## 2024-06-21 RX ORDER — KETOROLAC TROMETHAMINE 30 MG/ML
30 INJECTION, SOLUTION INTRAMUSCULAR; INTRAVENOUS ONCE
Status: COMPLETED | OUTPATIENT
Start: 2024-06-21 | End: 2024-06-21

## 2024-06-21 RX ORDER — ONDANSETRON HYDROCHLORIDE 2 MG/ML
INJECTION, SOLUTION INTRAVENOUS
Status: DISCONTINUED | OUTPATIENT
Start: 2024-06-21 | End: 2024-06-21

## 2024-06-21 RX ORDER — ROCURONIUM BROMIDE 10 MG/ML
INJECTION, SOLUTION INTRAVENOUS
Status: DISCONTINUED | OUTPATIENT
Start: 2024-06-21 | End: 2024-06-21

## 2024-06-21 RX ORDER — OXYCODONE AND ACETAMINOPHEN 5; 325 MG/1; MG/1
1 TABLET ORAL EVERY 4 HOURS PRN
Qty: 6 TABLET | Refills: 0 | Status: SHIPPED | OUTPATIENT
Start: 2024-06-21

## 2024-06-21 RX ORDER — ACETAMINOPHEN 10 MG/ML
INJECTION, SOLUTION INTRAVENOUS
Status: DISCONTINUED | OUTPATIENT
Start: 2024-06-21 | End: 2024-06-21

## 2024-06-21 RX ORDER — MEPERIDINE HYDROCHLORIDE 50 MG/ML
12.5 INJECTION INTRAMUSCULAR; INTRAVENOUS; SUBCUTANEOUS EVERY 10 MIN PRN
Status: DISCONTINUED | OUTPATIENT
Start: 2024-06-21 | End: 2024-06-21 | Stop reason: HOSPADM

## 2024-06-21 RX ORDER — FAMOTIDINE 10 MG/ML
INJECTION INTRAVENOUS
Status: DISCONTINUED | OUTPATIENT
Start: 2024-06-21 | End: 2024-06-21

## 2024-06-21 RX ORDER — ONDANSETRON HYDROCHLORIDE 2 MG/ML
4 INJECTION, SOLUTION INTRAVENOUS DAILY PRN
Status: DISCONTINUED | OUTPATIENT
Start: 2024-06-21 | End: 2024-06-21 | Stop reason: HOSPADM

## 2024-06-21 RX ORDER — HYDROMORPHONE HYDROCHLORIDE 1 MG/ML
0.2 INJECTION, SOLUTION INTRAMUSCULAR; INTRAVENOUS; SUBCUTANEOUS EVERY 5 MIN PRN
Status: DISCONTINUED | OUTPATIENT
Start: 2024-06-21 | End: 2024-06-21 | Stop reason: HOSPADM

## 2024-06-21 RX ORDER — DEXMEDETOMIDINE HYDROCHLORIDE 100 UG/ML
INJECTION, SOLUTION INTRAVENOUS
Status: DISCONTINUED | OUTPATIENT
Start: 2024-06-21 | End: 2024-06-21

## 2024-06-21 RX ORDER — LIDOCAINE HYDROCHLORIDE 10 MG/ML
INJECTION, SOLUTION INTRAVENOUS
Status: DISCONTINUED | OUTPATIENT
Start: 2024-06-21 | End: 2024-06-21

## 2024-06-21 RX ORDER — DIPHENHYDRAMINE HYDROCHLORIDE 50 MG/ML
12.5 INJECTION INTRAMUSCULAR; INTRAVENOUS
Status: DISCONTINUED | OUTPATIENT
Start: 2024-06-21 | End: 2024-06-21 | Stop reason: HOSPADM

## 2024-06-21 RX ORDER — OXYCODONE HYDROCHLORIDE 5 MG/1
5 TABLET ORAL
Status: DISCONTINUED | OUTPATIENT
Start: 2024-06-21 | End: 2024-06-21 | Stop reason: HOSPADM

## 2024-06-21 RX ADMIN — LABETALOL HYDROCHLORIDE 10 MG: 5 INJECTION, SOLUTION INTRAVENOUS at 08:06

## 2024-06-21 RX ADMIN — HYDROMORPHONE HYDROCHLORIDE 0.2 MG: 1 INJECTION, SOLUTION INTRAMUSCULAR; INTRAVENOUS; SUBCUTANEOUS at 09:06

## 2024-06-21 RX ADMIN — KETOROLAC TROMETHAMINE 30 MG: 30 INJECTION, SOLUTION INTRAMUSCULAR; INTRAVENOUS at 10:06

## 2024-06-21 RX ADMIN — FENTANYL CITRATE 100 MCG: 50 INJECTION, SOLUTION INTRAMUSCULAR; INTRAVENOUS at 07:06

## 2024-06-21 RX ADMIN — FAMOTIDINE 20 MG: 10 INJECTION, SOLUTION INTRAVENOUS at 07:06

## 2024-06-21 RX ADMIN — DEXAMETHASONE SODIUM PHOSPHATE 8 MG: 4 INJECTION, SOLUTION INTRAMUSCULAR; INTRAVENOUS at 07:06

## 2024-06-21 RX ADMIN — SUGAMMADEX 200 MG: 100 INJECTION, SOLUTION INTRAVENOUS at 08:06

## 2024-06-21 RX ADMIN — PROPOFOL 200 MG: 10 INJECTION, EMULSION INTRAVENOUS at 07:06

## 2024-06-21 RX ADMIN — DEXMEDETOMIDINE HYDROCHLORIDE 10 MCG: 100 INJECTION, SOLUTION INTRAVENOUS at 07:06

## 2024-06-21 RX ADMIN — ROCURONIUM BROMIDE 50 MG: 10 INJECTION, SOLUTION INTRAVENOUS at 07:06

## 2024-06-21 RX ADMIN — ONDANSETRON 4 MG: 2 INJECTION INTRAMUSCULAR; INTRAVENOUS at 07:06

## 2024-06-21 RX ADMIN — ACETAMINOPHEN 1000 MG: 10 INJECTION, SOLUTION INTRAVENOUS at 07:06

## 2024-06-21 RX ADMIN — MIDAZOLAM HYDROCHLORIDE 2 MG: 1 INJECTION, SOLUTION INTRAMUSCULAR; INTRAVENOUS at 07:06

## 2024-06-21 RX ADMIN — LABETALOL HYDROCHLORIDE 5 MG: 5 INJECTION, SOLUTION INTRAVENOUS at 08:06

## 2024-06-21 RX ADMIN — LIDOCAINE HYDROCHLORIDE 100 MG: 10 INJECTION, SOLUTION INTRAVENOUS at 07:06

## 2024-06-21 RX ADMIN — SODIUM CHLORIDE: 0.9 INJECTION, SOLUTION INTRAVENOUS at 07:06

## 2024-06-21 RX ADMIN — OXYCODONE HYDROCHLORIDE 5 MG: 5 TABLET ORAL at 08:06

## 2024-06-21 NOTE — OP NOTE
Preoperative diagnosis:  Desires elective sterilization    Postop diagnosis: Same    Procedure:  Laparoscopic tubal cauterization    Surgeon: Kb     Estimated blood loss:min    Complications: none    Anesthesia::  General    Findings:nl tubes uterus ovaries    Procedure in detail:    After appropriate informed consent was obtained, the patient was taken to the operating room and placed in the dorsal lithotomy position with legs in Uriel stirrups.  She was prepped and draped in the usual sterile fashion with the straight catheter used to drain the bladder and a sponge stick used to elevate the uterus.  Next, a small 5 mm incision was made in the umbilical well, through this incision while lifting up on the abdomen, the Veress needle was inserted and the saline drop test was used to confirm placement into the cavity with the needle.  Next, the CO2 was allowed to insufflate the abdomen to appropriate pressures.  Next, under direct visualization with a 5 mm Optiview trocar, this was inserted into this incision under direct visualization into the peritoneal cavity.  Next, 2 5 mm incision were ,made laterally.  Through these incisions, a 5 mm trocar was inserted under direct visualization.  Next, a blunt probe was used to identify the fallopian tubes.  Next, ligasure device forceps and were introduced into the left port and blunt forceps in the right port grasping under the fimbriated ends coagulating and cutting along the mesosalpinx with the ligasure removing each tube in a similar fashion out of the right port.   Afterwards, the instruments were removed.  The gas was allowed to escape from the abdomen.  The incision was closed with suture.

## 2024-06-21 NOTE — ANESTHESIA POSTPROCEDURE EVALUATION
Anesthesia Post Evaluation    Patient: Yvan Thurston    Procedure(s) Performed: Procedure(s) (LRB):  LIGATION, FALLOPIAN TUBE, LAPAROSCOPIC (Bilateral)    Final Anesthesia Type: general      Patient location during evaluation: PACU  Patient participation: Yes- Able to Participate  Level of consciousness: awake and alert and oriented  Post-procedure vital signs: reviewed and stable  Pain management: adequate  Airway patency: patent    PONV status at discharge: No PONV  Anesthetic complications: no      Cardiovascular status: blood pressure returned to baseline, hemodynamically stable and stable  Respiratory status: unassisted, spontaneous ventilation and room air  Hydration status: euvolemic  Follow-up not needed.              Vitals Value Taken Time   /66 06/21/24 1015   Temp 36.1 °C (97 °F) 06/21/24 1000   Pulse 67 06/21/24 1020   Resp 13 06/21/24 1020   SpO2 99 % 06/21/24 1020   Vitals shown include unfiled device data.      No case tracking events are documented in the log.      Pain/Bea Score: Pain Rating Prior to Med Admin: 7 (6/21/2024 10:06 AM)  Bea Score: 9 (6/21/2024 10:15 AM)

## 2024-06-21 NOTE — PLAN OF CARE
Patient taken to day surgery via stretcher at this time. Drowsy but awakens easily, not distressful. Velvet, RN at bedside to assume care of patient.

## 2024-06-21 NOTE — ANESTHESIA PROCEDURE NOTES
Intubation    Date/Time: 6/21/2024 7:50 AM    Performed by: Kusum Allen CRNA  Authorized by: Newton Milian MD    Intubation:     Induction:  Intravenous    Intubated:  Postinduction    Mask Ventilation:  Easy mask    Attempts:  1    Attempted By:  CRNA    Method of Intubation:  Direct    Blade:  Corado 3    Laryngeal View Grade: Grade I - full view of cords      Difficult Airway Encountered?: No      Complications:  None    Airway Device:  Oral endotracheal tube    Airway Device Size:  7.0    Style/Cuff Inflation:  Cuffed (inflated to minimal occlusive pressure)    Tube secured:  21    Secured at:  The lips    Placement Verified By:  Capnometry    Complicating Factors:  None    Findings Post-Intubation:  BS equal bilateral and atraumatic/condition of teeth unchanged

## 2024-06-21 NOTE — ANESTHESIA PREPROCEDURE EVALUATION
06/21/2024  Yvan Thurston is a 29 y.o., female.      Pre-op Assessment    I have reviewed the Patient Summary Reports.     I have reviewed the Nursing Notes. I have reviewed the NPO Status.   I have reviewed the Medications.     Review of Systems  Anesthesia Hx:             Denies Family Hx of Anesthesia complications.    Denies Personal Hx of Anesthesia complications.                    Social:  Non-Smoker, No Alcohol Use       Hematology/Oncology:  Hematology Normal   Oncology Normal                                   EENT/Dental:  EENT/Dental Normal           Cardiovascular:  Cardiovascular Normal                                            Pulmonary:  Pulmonary Normal                       Renal/:  Renal/ Normal                 Hepatic/GI:  Hepatic/GI Normal                 Musculoskeletal:  Musculoskeletal Normal                Neurological:  Neurology Normal                                      Endocrine:  Endocrine Normal            Psych:  Psychiatric Normal                    Physical Exam  General: Well nourished, Cooperative, Alert and Oriented    Airway:  Mallampati: III / II  Mouth Opening: Normal  TM Distance: > 6 cm  Tongue: Normal  Neck ROM: Normal ROM    Dental:  Intact    Chest/Lungs:  Clear to auscultation, Normal Respiratory Rate    Heart:  Rate: Normal  Rhythm: Regular Rhythm  Sounds: Normal        Anesthesia Plan  Type of Anesthesia, risks & benefits discussed:    Anesthesia Type: Gen ETT  Intra-op Monitoring Plan: Standard ASA Monitors  Post Op Pain Control Plan: multimodal analgesia  Induction:  IV  Airway Plan: Video, Post-Induction  Informed Consent: Informed consent signed with the Patient and all parties understand the risks and agree with anesthesia plan.  All questions answered.   ASA Score: 1  Anesthesia Plan Notes: Multimodal analgesia:  IV acetaminophen, Decadron 8 mg    Antiemetics:  Zofran, Pepcid, Benadryl 12.5 mg    Ready For Surgery From Anesthesia Perspective.     .

## 2024-06-21 NOTE — TRANSFER OF CARE
Anesthesia Transfer of Care Note    Patient: Yvan Thurston    Procedure(s) Performed: Procedure(s) (LRB):  LIGATION, FALLOPIAN TUBE, LAPAROSCOPIC (Bilateral)    Patient location: PACU    Anesthesia Type: general    Transport from OR: Transported from OR on room air with adequate spontaneous ventilation    Post pain: adequate analgesia    Post assessment: no apparent anesthetic complications    Post vital signs: stable    Level of consciousness: awake    Nausea/Vomiting: no nausea/vomiting    Complications: none    Transfer of care protocol was followed    Last vitals: Visit Vitals  BP (!) 153/98   Pulse 75   Temp 36.7 °C (98.1 °F) (Oral)   Resp 16   Wt 77.1 kg (170 lb)   LMP 05/22/2024   SpO2 100%   Breastfeeding No   BMI 27.44 kg/m²

## 2024-11-21 ENCOUNTER — HOSPITAL ENCOUNTER (EMERGENCY)
Facility: HOSPITAL | Age: 29
Discharge: HOME | End: 2024-11-21
Attending: EMERGENCY MEDICINE
Payer: MEDICAID

## 2024-11-21 VITALS
HEIGHT: 66 IN | RESPIRATION RATE: 16 BRPM | BODY MASS INDEX: 31.82 KG/M2 | OXYGEN SATURATION: 100 % | SYSTOLIC BLOOD PRESSURE: 148 MMHG | DIASTOLIC BLOOD PRESSURE: 95 MMHG | WEIGHT: 198 LBS | HEART RATE: 83 BPM | TEMPERATURE: 99 F

## 2024-11-21 DIAGNOSIS — K04.7 DENTAL ABSCESS: Primary | ICD-10-CM

## 2024-11-21 LAB
B-HCG UR QL: NEGATIVE
CTP QC/QA: YES

## 2024-11-21 PROCEDURE — 81025 URINE PREGNANCY TEST: CPT | Performed by: EMERGENCY MEDICINE

## 2024-11-21 PROCEDURE — 99284 EMERGENCY DEPT VISIT MOD MDM: CPT

## 2024-11-21 RX ORDER — HYDROCODONE BITARTRATE AND ACETAMINOPHEN 5; 325 MG/1; MG/1
1 TABLET ORAL EVERY 6 HOURS PRN
Qty: 9 TABLET | Refills: 0 | Status: SHIPPED | OUTPATIENT
Start: 2024-11-21

## 2024-11-21 RX ORDER — AMOXICILLIN AND CLAVULANATE POTASSIUM 875; 125 MG/1; MG/1
1 TABLET, FILM COATED ORAL EVERY 12 HOURS
Qty: 28 TABLET | Refills: 0 | Status: SHIPPED | OUTPATIENT
Start: 2024-11-21 | End: 2024-12-05

## 2024-11-21 NOTE — Clinical Note
"Yvan"Yvan" Karena was seen and treated in our emergency department on 11/21/2024.  She may return to work on 11/25/2024.       If you have any questions or concerns, please don't hesitate to call.      JOAQUIN Deng RN    "

## 2024-11-22 NOTE — ED PROVIDER NOTES
Encounter Date: 11/21/2024       History     Chief Complaint   Patient presents with    Facial Pain     C/o right jaw pain and swelling since this am. Denies fever.      29-year-old female past medical history of herpes presents to the emergency department for swelling in the right side of her face.  Patient states that this started this morning.  She denies fever.  States that she has not contacted a dentist yes but will contacted dentist when she leaves here.  Patient reports that she has had this issue previously with the same tooth.  She denies foul taste in her mouth, difficulty breathing, difficulty swallowing or pain with swallowing        Review of patient's allergies indicates:  No Known Allergies  Past Medical History:   Diagnosis Date    Herpes simplex virus (HSV) infection      Past Surgical History:   Procedure Laterality Date    LAPAROSCOPIC LIGATION OF FALLOPIAN TUBE Bilateral 6/21/2024    Procedure: LIGATION, FALLOPIAN TUBE, LAPAROSCOPIC;  Surgeon: Ethan Quiles MD;  Location: [facility];  Service: OB/GYN;  Laterality: Bilateral;     No family history on file.  Social History     Tobacco Use    Smoking status: Never    Smokeless tobacco: Never   Substance Use Topics    Alcohol use: Not Currently    Drug use: Yes     Types: Marijuana     Review of Systems   Constitutional: Negative.    HENT:  Positive for dental problem.    Respiratory: Negative.     Cardiovascular: Negative.    Gastrointestinal: Negative.    Neurological: Negative.        Physical Exam     Initial Vitals   BP Pulse Resp Temp SpO2   11/21/24 2038 11/21/24 2038 11/21/24 2038 11/21/24 2038 11/21/24 2038   (!) 148/95 83 16 98.9 °F (37.2 °C) 100 %      MAP       --                Physical Exam    Vitals reviewed.  Constitutional: She appears well-developed and well-nourished. She is not diaphoretic. No distress.   HENT:   Head: Atraumatic.   Right Ear: External ear normal.   Left Ear: External ear normal. Mouth/Throat: Oropharynx is  clear and moist. No oropharyngeal exudate.   Minimal swelling noted to the right side lower jaw.  Patient does have a cracked tooth at tooth 26 and 27.  There is no notable pocket of pus or fluctuance.  Oropharynx is clear and moist.  Airway is patent.  I do not note a muffled voice, elevation to the floor of the mouth, swelling of the tongue, swelling of the tonsils or deviation of the uvula.   Eyes: Conjunctivae and EOM are normal.   Neck:   Normal range of motion.  Cardiovascular:  Normal rate, regular rhythm and normal heart sounds.           Pulmonary/Chest: Breath sounds normal. No respiratory distress. She has no wheezes.   Abdominal: Abdomen is soft. She exhibits no distension. There is no abdominal tenderness. There is no rebound.   Musculoskeletal:      Cervical back: Normal range of motion.     Neurological: She is alert. GCS score is 15. GCS eye subscore is 4. GCS verbal subscore is 5. GCS motor subscore is 6.   Skin: Skin is warm. Capillary refill takes less than 2 seconds.         ED Course   Procedures  Labs Reviewed   POCT URINE PREGNANCY       Result Value    POC Preg Test, Ur Negative       Acceptable Yes            Imaging Results    None          Medications - No data to display  Medical Decision Making  29-year-old female past medical history of herpes presents to the emergency department for swelling in the right side of her face.  Patient states that this started this morning.  She denies fever.  States that she has not contacted a dentist yes but will contacted dentist when she leaves here.  Patient reports that she has had this issue previously with the same tooth.  She denies foul taste in her mouth, difficulty breathing, difficulty swallowing or pain with swallowing    Considerations include but not limited to dental abscess, cellulitis, retropharyngeal abscess, peritonsillar abscess, cracked tooth    Vitals stable.  Patient afebrile.  She is well-appearing on physical exam.   Patient does have minimal swelling noted to the right lower jaw as well as swelling noted to the 26th and 27th tooth.  Both teeth appear to be cracked.  I do not note any pockets of pus or fluctuance that need to be drained.  Patient does not have a muffled voice.  There is no swelling noted to the oropharynx.  Airway is patent.  No swelling to the tongue elevation of the floor of the mouth, tonsillar swelling or uvular deviation.  Given patient has not experienced a fever and has minimal swelling she will be discharged with oral antibiotics and informed to follow up with a dentist outpatient.  Informed her that if she develops a fever or her swelling worsens or pain significantly increases she needs to return to the emergency department.  She verbalized her understanding of the plan and agreed.  Plan also discussed with my attending and all questions were answered at the bedside.    Amount and/or Complexity of Data Reviewed  Labs: ordered.    Risk  Prescription drug management.                                      Clinical Impression:  Final diagnoses:  [K04.7] Dental abscess (Primary)          ED Disposition Condition    Discharge Stable          ED Prescriptions       Medication Sig Dispense Start Date End Date Auth. Provider    amoxicillin-clavulanate 875-125mg (AUGMENTIN) 875-125 mg per tablet Take 1 tablet by mouth every 12 (twelve) hours. for 14 days 28 tablet 11/21/2024 12/5/2024 Jazz Franco PA-C    HYDROcodone-acetaminophen (NORCO) 5-325 mg per tablet Take 1 tablet by mouth every 6 (six) hours as needed for Pain (As needed for pain). 9 tablet 11/21/2024 -- Jazz Franco PA-C          Follow-up Information       Follow up With Specialties Details Why Contact Info    [facility]  Call   [address]  [place] [address]  [phone]      Dentistry, [facility] Call   [address] [place] [id number] [phone]                Jazz Franco PA-C  11/22/24 0028

## 2024-11-22 NOTE — DISCHARGE INSTRUCTIONS
Please take antibiotics as prescribed.  Do not drive or drink while taking pain medication.  Please follow up with your dentist as soon as possible.  Return to the emergency department if you develop fever, increase in swelling, increase in pain.

## 2024-12-28 ENCOUNTER — HOSPITAL ENCOUNTER (EMERGENCY)
Facility: HOSPITAL | Age: 29
Discharge: HOME | End: 2024-12-28
Attending: STUDENT IN AN ORGANIZED HEALTH CARE EDUCATION/TRAINING PROGRAM
Payer: MEDICAID

## 2024-12-28 VITALS
DIASTOLIC BLOOD PRESSURE: 87 MMHG | BODY MASS INDEX: 30.22 KG/M2 | RESPIRATION RATE: 16 BRPM | WEIGHT: 188 LBS | OXYGEN SATURATION: 98 % | HEART RATE: 84 BPM | TEMPERATURE: 99 F | HEIGHT: 66 IN | SYSTOLIC BLOOD PRESSURE: 155 MMHG

## 2024-12-28 DIAGNOSIS — A60.00 GENITAL HERPES SIMPLEX, UNSPECIFIED SITE: Primary | ICD-10-CM

## 2024-12-28 LAB
B-HCG UR QL: NEGATIVE
CTP QC/QA: YES
INFLUENZA A, MOLECULAR: NEGATIVE
INFLUENZA B, MOLECULAR: NEGATIVE
SARS-COV-2 RDRP RESP QL NAA+PROBE: NEGATIVE
SPECIMEN SOURCE: NORMAL

## 2024-12-28 PROCEDURE — 99283 EMERGENCY DEPT VISIT LOW MDM: CPT

## 2024-12-28 PROCEDURE — 25000003 PHARM REV CODE 250: Performed by: STUDENT IN AN ORGANIZED HEALTH CARE EDUCATION/TRAINING PROGRAM

## 2024-12-28 PROCEDURE — 81025 URINE PREGNANCY TEST: CPT | Performed by: STUDENT IN AN ORGANIZED HEALTH CARE EDUCATION/TRAINING PROGRAM

## 2024-12-28 PROCEDURE — 87502 INFLUENZA DNA AMP PROBE: CPT | Performed by: STUDENT IN AN ORGANIZED HEALTH CARE EDUCATION/TRAINING PROGRAM

## 2024-12-28 PROCEDURE — 87635 SARS-COV-2 COVID-19 AMP PRB: CPT | Performed by: STUDENT IN AN ORGANIZED HEALTH CARE EDUCATION/TRAINING PROGRAM

## 2024-12-28 RX ORDER — ACETAMINOPHEN 500 MG
1000 TABLET ORAL
Status: COMPLETED | OUTPATIENT
Start: 2024-12-28 | End: 2024-12-28

## 2024-12-28 RX ORDER — VALACYCLOVIR HYDROCHLORIDE 1 G/1
500 TABLET, FILM COATED ORAL EVERY 12 HOURS
Qty: 3 TABLET | Refills: 0 | Status: SHIPPED | OUTPATIENT
Start: 2024-12-28 | End: 2024-12-31

## 2024-12-28 RX ADMIN — ACETAMINOPHEN 1000 MG: 500 TABLET ORAL at 01:12

## 2024-12-28 NOTE — ED PROVIDER NOTES
Encounter Date: 12/28/2024       History     Chief Complaint   Patient presents with    Headache    Fever    Herpes Zoster     Patient states that she is having an outbreak of herpes     HPI    29-year-old female presenting for evaluation of fever, headache and genital lesion outbreak.  She reports feeling ill today and generally fatigued as well as a mild frontal headache.  She was concerned she may be getting fever and came to the emergency department to figure out why.  So far she has not had other symptoms including nasal congestion rhinorrhea, nausea, vomiting, abdominal pain, urinary urgency or frequency.  She did however noticed the onset of genital HSV lesions which she has had in the past.  This started today and she has not noticed lesions anywhere else.  She has multiple children at home who have been sick however is not breastfeeding.    Review of patient's allergies indicates:  No Known Allergies  Past Medical History:   Diagnosis Date    Herpes simplex virus (HSV) infection      Past Surgical History:   Procedure Laterality Date    LAPAROSCOPIC LIGATION OF FALLOPIAN TUBE Bilateral 6/21/2024    Procedure: LIGATION, FALLOPIAN TUBE, LAPAROSCOPIC;  Surgeon: Ethan Quiles MD;  Location: [facility];  Service: OB/GYN;  Laterality: Bilateral;     No family history on file.  Social History     Tobacco Use    Smoking status: Never    Smokeless tobacco: Never   Substance Use Topics    Alcohol use: Not Currently    Drug use: Yes     Types: Marijuana     Review of Systems  Negative besides where noted above  Physical Exam     Initial Vitals   BP Pulse Resp Temp SpO2   12/28/24 0055 12/28/24 0055 12/28/24 0055 12/28/24 0055 12/28/24 0055   (!) 157/92 96 18 (!) 101.3 °F (38.5 °C) 99 %      MAP       --                Physical Exam    Vitals reviewed.  Constitutional: She appears well-developed and well-nourished.   HENT:   Head: Normocephalic and atraumatic.   Eyes: EOM are normal. Pupils are equal, round, and  reactive to light.   Neck: Neck supple.   Normal range of motion.  Cardiovascular:  Normal rate.     Exam reveals no gallop and no friction rub.       No murmur heard.  Pulmonary/Chest: Breath sounds normal. She has no wheezes. She has no rhonchi. She has no rales. She exhibits no tenderness.   Abdominal: Bowel sounds are normal. She exhibits no mass. There is no abdominal tenderness. There is no rebound and no guarding.   Musculoskeletal:      Cervical back: Normal range of motion and neck supple.     Neurological: She is alert and oriented to person, place, and time. GCS score is 15. GCS eye subscore is 4. GCS verbal subscore is 5. GCS motor subscore is 6.   Skin: Skin is warm. Capillary refill takes less than 2 seconds.         ED Course   Procedures  Labs Reviewed   INFLUENZA A AND B ANTIGEN       Result Value    Influenza A, Molecular Negative      Influenza B, Molecular Negative      Flu A & B Source Nasal swab      Narrative:     Specimen Source->Nasopharyngeal Swab   SARS-COV-2 RNA AMPLIFICATION, QUAL    SARS-CoV-2 RNA, Amplification, Qual Negative            Imaging Results    None          Medications   acetaminophen tablet 1,000 mg (1,000 mg Oral Given 12/28/24 0100)     Medical Decision Making  29-year-old female presenting for evaluation as above.  She is febrile to 101.3 on initial evaluation and otherwise hemodynamically stable.  She is GCS 15 and alert and oriented.  Range of motion is swollen neck.  She is overall well-appearing and is concerned regarding genital HSV lesions.  She is requesting medication for this for which I will prescribe Valtrex.  COVID and flu screen are negative.  However feel this may be the early onset of a viral syndrome.  I did discuss with the patient the possibility of meningitis given fever, headache, genital lesions however overall patient is very well-appearing and without other signs suggestive of meningoencephalitis.  We will defer LP for now and treat genital  lesions.  I have counseled patient to return to the emergency department if she has new or worsening symptoms including confusion, seizure-like activity, worsening headache, vision changes, neck stiffness.  She voiced understanding and agreement with the plan.    Kirti Wise MD      Problems Addressed:  Genital herpes simplex, unspecified site: acute illness or injury    Risk  Prescription drug management.                                      Clinical Impression:  Final diagnoses:  [A60.00] Genital herpes simplex, unspecified site (Primary)          ED Disposition Condition    Discharge Stable          ED Prescriptions       Medication Sig Dispense Start Date End Date Auth. Provider    valACYclovir (VALTREX) 1000 MG tablet Take 0.5 tablets (500 mg total) by mouth every 12 (twelve) hours. for 3 days 3 tablet 12/28/2024 12/31/2024 Kirti Wise MD          Follow-up Information       Follow up With Specialties Details Why Contact Info Additional Information    [facility] - Emergency Dept Emergency Medicine Go to  As needed, If symptoms worsen [address]  [facility] [address]  [phone] 1st floor             Kirti Wise MD  Resident  12/28/24 0249

## 2024-12-28 NOTE — DISCHARGE INSTRUCTIONS
Please return to the emergency department if you have worsening fever, worsening headache, vision changes, seizure-like activity, confusion, weakness or other issues which concern you.

## 2024-12-29 ENCOUNTER — HOSPITAL ENCOUNTER (EMERGENCY)
Facility: HOSPITAL | Age: 29
Discharge: HOME | End: 2024-12-29
Attending: STUDENT IN AN ORGANIZED HEALTH CARE EDUCATION/TRAINING PROGRAM
Payer: MEDICAID

## 2024-12-29 VITALS
TEMPERATURE: 98 F | HEIGHT: 66 IN | DIASTOLIC BLOOD PRESSURE: 76 MMHG | OXYGEN SATURATION: 100 % | HEART RATE: 87 BPM | SYSTOLIC BLOOD PRESSURE: 141 MMHG | WEIGHT: 144 LBS | RESPIRATION RATE: 17 BRPM | BODY MASS INDEX: 23.14 KG/M2

## 2024-12-29 DIAGNOSIS — R31.9 URINARY TRACT INFECTION WITH HEMATURIA, SITE UNSPECIFIED: ICD-10-CM

## 2024-12-29 DIAGNOSIS — R55 VASOVAGAL SYNCOPE: Primary | ICD-10-CM

## 2024-12-29 DIAGNOSIS — N39.0 URINARY TRACT INFECTION WITH HEMATURIA, SITE UNSPECIFIED: ICD-10-CM

## 2024-12-29 DIAGNOSIS — E87.6 HYPOKALEMIA: ICD-10-CM

## 2024-12-29 LAB
ALBUMIN SERPL BCP-MCNC: 3.9 G/DL (ref 3.5–5.2)
ALP SERPL-CCNC: 132 U/L (ref 40–150)
ALT SERPL W/O P-5'-P-CCNC: 35 U/L (ref 10–44)
ANION GAP SERPL CALC-SCNC: 12 MMOL/L (ref 8–16)
AST SERPL-CCNC: 26 U/L (ref 10–40)
B-HCG UR QL: NEGATIVE
BACTERIA #/AREA URNS HPF: ABNORMAL /HPF
BASOPHILS # BLD AUTO: 0.02 K/UL (ref 0–0.2)
BASOPHILS NFR BLD: 0.2 % (ref 0–1.9)
BILIRUB SERPL-MCNC: 0.4 MG/DL (ref 0.1–1)
BILIRUB UR QL STRIP: NEGATIVE
BNP SERPL-MCNC: 17 PG/ML (ref 0–99)
BUN SERPL-MCNC: 7 MG/DL (ref 6–20)
CALCIUM SERPL-MCNC: 9.2 MG/DL (ref 8.7–10.5)
CHLORIDE SERPL-SCNC: 99 MMOL/L (ref 95–110)
CLARITY UR: ABNORMAL
CO2 SERPL-SCNC: 25 MMOL/L (ref 23–29)
COLOR UR: ABNORMAL
CREAT SERPL-MCNC: 0.8 MG/DL (ref 0.5–1.4)
CTP QC/QA: YES
DIFFERENTIAL METHOD BLD: ABNORMAL
EOSINOPHIL # BLD AUTO: 0 K/UL (ref 0–0.5)
EOSINOPHIL NFR BLD: 0.4 % (ref 0–8)
ERYTHROCYTE [DISTWIDTH] IN BLOOD BY AUTOMATED COUNT: 15.4 % (ref 11.5–14.5)
EST. GFR  (NO RACE VARIABLE): >60 ML/MIN/1.73 M^2
GLUCOSE SERPL-MCNC: 107 MG/DL (ref 70–110)
GLUCOSE UR QL STRIP: NEGATIVE
HCT VFR BLD AUTO: 40.6 % (ref 37–48.5)
HGB BLD-MCNC: 12.6 G/DL (ref 12–16)
HGB UR QL STRIP: ABNORMAL
HYALINE CASTS #/AREA URNS LPF: 0 /LPF
IMM GRANULOCYTES # BLD AUTO: 0.04 K/UL (ref 0–0.04)
IMM GRANULOCYTES NFR BLD AUTO: 0.4 % (ref 0–0.5)
KETONES UR QL STRIP: ABNORMAL
LEUKOCYTE ESTERASE UR QL STRIP: ABNORMAL
LYMPHOCYTES # BLD AUTO: 0.5 K/UL (ref 1–4.8)
LYMPHOCYTES NFR BLD: 5.4 % (ref 18–48)
MAGNESIUM SERPL-MCNC: 1.6 MG/DL (ref 1.6–2.6)
MCH RBC QN AUTO: 24.4 PG (ref 27–31)
MCHC RBC AUTO-ENTMCNC: 31 G/DL (ref 32–36)
MCV RBC AUTO: 79 FL (ref 82–98)
MICROSCOPIC COMMENT: ABNORMAL
MONOCYTES # BLD AUTO: 0.6 K/UL (ref 0.3–1)
MONOCYTES NFR BLD: 5.6 % (ref 4–15)
NEUTROPHILS # BLD AUTO: 8.6 K/UL (ref 1.8–7.7)
NEUTROPHILS NFR BLD: 88 % (ref 38–73)
NITRITE UR QL STRIP: NEGATIVE
NRBC BLD-RTO: 0 /100 WBC
PH UR STRIP: 6 [PH] (ref 5–8)
PLATELET # BLD AUTO: 222 K/UL (ref 150–450)
PMV BLD AUTO: 9.9 FL (ref 9.2–12.9)
POTASSIUM SERPL-SCNC: 3 MMOL/L (ref 3.5–5.1)
PROT SERPL-MCNC: 8.1 G/DL (ref 6–8.4)
PROT UR QL STRIP: ABNORMAL
RBC # BLD AUTO: 5.16 M/UL (ref 4–5.4)
RBC #/AREA URNS HPF: >100 /HPF (ref 0–4)
SODIUM SERPL-SCNC: 136 MMOL/L (ref 136–145)
SP GR UR STRIP: 1.02 (ref 1–1.03)
SQUAMOUS #/AREA URNS HPF: 15 /HPF
TROPONIN I SERPL DL<=0.01 NG/ML-MCNC: 0.02 NG/ML (ref 0–0.03)
URN SPEC COLLECT METH UR: ABNORMAL
UROBILINOGEN UR STRIP-ACNC: ABNORMAL EU/DL
WBC # BLD AUTO: 9.77 K/UL (ref 3.9–12.7)
WBC #/AREA URNS HPF: >100 /HPF (ref 0–5)

## 2024-12-29 PROCEDURE — 99284 EMERGENCY DEPT VISIT MOD MDM: CPT | Mod: 25

## 2024-12-29 PROCEDURE — 80053 COMPREHEN METABOLIC PANEL: CPT | Performed by: STUDENT IN AN ORGANIZED HEALTH CARE EDUCATION/TRAINING PROGRAM

## 2024-12-29 PROCEDURE — 81000 URINALYSIS NONAUTO W/SCOPE: CPT | Performed by: STUDENT IN AN ORGANIZED HEALTH CARE EDUCATION/TRAINING PROGRAM

## 2024-12-29 PROCEDURE — 87086 URINE CULTURE/COLONY COUNT: CPT | Performed by: STUDENT IN AN ORGANIZED HEALTH CARE EDUCATION/TRAINING PROGRAM

## 2024-12-29 PROCEDURE — 93005 ELECTROCARDIOGRAM TRACING: CPT

## 2024-12-29 PROCEDURE — 81025 URINE PREGNANCY TEST: CPT | Performed by: STUDENT IN AN ORGANIZED HEALTH CARE EDUCATION/TRAINING PROGRAM

## 2024-12-29 PROCEDURE — 36415 COLL VENOUS BLD VENIPUNCTURE: CPT | Performed by: STUDENT IN AN ORGANIZED HEALTH CARE EDUCATION/TRAINING PROGRAM

## 2024-12-29 PROCEDURE — 83880 ASSAY OF NATRIURETIC PEPTIDE: CPT | Performed by: STUDENT IN AN ORGANIZED HEALTH CARE EDUCATION/TRAINING PROGRAM

## 2024-12-29 PROCEDURE — 83735 ASSAY OF MAGNESIUM: CPT | Performed by: STUDENT IN AN ORGANIZED HEALTH CARE EDUCATION/TRAINING PROGRAM

## 2024-12-29 PROCEDURE — 85025 COMPLETE CBC W/AUTO DIFF WBC: CPT | Performed by: STUDENT IN AN ORGANIZED HEALTH CARE EDUCATION/TRAINING PROGRAM

## 2024-12-29 PROCEDURE — 25000003 PHARM REV CODE 250: Performed by: STUDENT IN AN ORGANIZED HEALTH CARE EDUCATION/TRAINING PROGRAM

## 2024-12-29 PROCEDURE — 94761 N-INVAS EAR/PLS OXIMETRY MLT: CPT

## 2024-12-29 PROCEDURE — 93010 ELECTROCARDIOGRAM REPORT: CPT | Mod: ,,, | Performed by: INTERNAL MEDICINE

## 2024-12-29 PROCEDURE — 96360 HYDRATION IV INFUSION INIT: CPT

## 2024-12-29 PROCEDURE — 84484 ASSAY OF TROPONIN QUANT: CPT | Performed by: STUDENT IN AN ORGANIZED HEALTH CARE EDUCATION/TRAINING PROGRAM

## 2024-12-29 RX ORDER — AMOXICILLIN AND CLAVULANATE POTASSIUM 875; 125 MG/1; MG/1
1 TABLET, FILM COATED ORAL 2 TIMES DAILY
Qty: 14 TABLET | Refills: 0 | Status: SHIPPED | OUTPATIENT
Start: 2024-12-29

## 2024-12-29 RX ADMIN — SODIUM CHLORIDE 1000 ML: 9 INJECTION, SOLUTION INTRAVENOUS at 05:12

## 2024-12-29 RX ADMIN — POTASSIUM BICARBONATE 50 MEQ: 977.5 TABLET, EFFERVESCENT ORAL at 06:12

## 2024-12-29 NOTE — ED NOTES
Assumed care:  Yvan Thurston is awake, alert and oriented x 3, skin warm and dry, in NAD.  Patient CO syncope x 2 after smoking tobacco out of a hooka, states she has done this many times.    Patient identifiers for Yvan Thurston checked and correct.  LOC:  Yvan Thurston is awake, alert, and aware of environment with an appropriate affect. She is oriented x 3 and speaking appropriately.  APPEARANCE:  She is resting comfortably and in no acute distress. She is clean and well groomed, patient's clothing is properly fastened.  SKIN:  The skin is warm and dry. She has normal skin turgor and moist mucus membranes. Skin is intact; no bruising or breakdown noted.  MUSCULOSKELETAL:  She is moving all extremities well, no obvious deformities noted. Pulses intact.   RESPIRATORY:  Airway is open and patent. Respirations are spontaneous and non-labored with normal effort and rate.  CARDIAC:  She has a normal rate and rhythm. No peripheral edema noted. Capillary refill < 3 seconds.  ABDOMEN:  No distention noted.  Soft and non-tender upon palpation.  NEUROLOGICAL:  PERRL. Facial expression is symmetrical. Hand grasps are equal bilaterally. Normal sensation in all extremities when touched with finger.  syncope  Allergies reported:  Review of patient's allergies indicates:  No Known Allergies

## 2024-12-29 NOTE — ED PROVIDER NOTES
Encounter Date: 12/29/2024       History     Chief Complaint   Patient presents with    Loss of Consciousness     Pt states she passed out twice today after smoking a hookah. Pt states her herpes is also flaring up again     29-year-old female presents for evaluation of syncopal event.  Occurred today after using a hookah.  No associated chest pain or shortness for breath or traumatic injury.  Currently asymptomatic.    The history is provided by the patient.     Review of patient's allergies indicates:  No Known Allergies  Past Medical History:   Diagnosis Date    Herpes simplex virus (HSV) infection      Past Surgical History:   Procedure Laterality Date    LAPAROSCOPIC LIGATION OF FALLOPIAN TUBE Bilateral 6/21/2024    Procedure: LIGATION, FALLOPIAN TUBE, LAPAROSCOPIC;  Surgeon: Ethan Quiles MD;  Location: [facility] OR;  Service: OB/GYN;  Laterality: Bilateral;     No family history on file.  Social History     Tobacco Use    Smoking status: Never    Smokeless tobacco: Never   Substance Use Topics    Alcohol use: Not Currently    Drug use: Yes     Types: Marijuana     Review of Systems   All other systems reviewed and are negative.      Physical Exam     Initial Vitals [12/29/24 1603]   BP Pulse Resp Temp SpO2   (!) 141/78 100 17 97.7 °F (36.5 °C) 98 %      MAP       --         Physical Exam    Nursing note and vitals reviewed.  Constitutional: No distress.   HENT:   Head: Normocephalic.   Eyes: No scleral icterus.   Cardiovascular:  Normal rate.           Pulmonary/Chest: Effort normal. No stridor. No respiratory distress.   Abdominal: There is no guarding.     Neurological: She is alert.   GCS 15 no focal deficits   Skin: No rash noted. No erythema.         ED Course   Procedures  Labs Reviewed   CBC W/ AUTO DIFFERENTIAL - Abnormal       Result Value    WBC 9.77      RBC 5.16      Hemoglobin 12.6      Hematocrit 40.6      MCV 79 (*)     MCH 24.4 (*)     MCHC 31.0 (*)     RDW 15.4 (*)     Platelets 222      MPV  9.9      Immature Granulocytes 0.4      Gran # (ANC) 8.6 (*)     Immature Grans (Abs) 0.04      Lymph # 0.5 (*)     Mono # 0.6      Eos # 0.0      Baso # 0.02      nRBC 0      Gran % 88.0 (*)     Lymph % 5.4 (*)     Mono % 5.6      Eosinophil % 0.4      Basophil % 0.2      Differential Method Automated     COMPREHENSIVE METABOLIC PANEL - Abnormal    Sodium 136      Potassium 3.0 (*)     Chloride 99      CO2 25      Glucose 107      BUN 7      Creatinine 0.8      Calcium 9.2      Total Protein 8.1      Albumin 3.9      Total Bilirubin 0.4      Alkaline Phosphatase 132      AST 26      ALT 35      eGFR >60      Anion Gap 12     URINALYSIS, REFLEX TO URINE CULTURE - Abnormal    Specimen UA Urine, Clean Catch      Color, UA Brown (*)     Appearance, UA Cloudy (*)     pH, UA 6.0      Specific Gravity, UA 1.025      Protein, UA 2+ (*)     Glucose, UA Negative      Ketones, UA 1+ (*)     Bilirubin (UA) Negative      Occult Blood UA 3+ (*)     Nitrite, UA Negative      Urobilinogen, UA 4.0-6.0 (*)     Leukocytes, UA 3+ (*)     Narrative:     Specimen Source->Urine   URINALYSIS MICROSCOPIC - Abnormal    RBC, UA >100 (*)     WBC, UA >100 (*)     Bacteria Many (*)     Squam Epithel, UA 15      Hyaline Casts, UA 0      Microscopic Comment SEE COMMENT      Narrative:     Specimen Source->Urine   CULTURE, URINE   TROPONIN I    Troponin I 0.021     B-TYPE NATRIURETIC PEPTIDE    BNP 17     MAGNESIUM    Magnesium 1.6     POCT URINE PREGNANCY    POC Preg Test, Ur Negative       Acceptable Yes            Imaging Results    None          Medications   sodium chloride 0.9% bolus 1,000 mL 1,000 mL (0 mLs Intravenous Stopped 12/29/24 1856)   potassium bicarbonate disintegrating tablet 50 mEq (50 mEq Oral Given 12/29/24 1822)     Medical Decision Making  29-year-old female presents for evaluation of syncopal episode while smoking.  Suspect vasovagal episode, EKG obtained with no dysrhythmia, lab work with hypokalemia and  normal magnesium repleted orally.  Cardiac markers within normal limits, no chest pain or shortness a breath or dysrhythmias here in ED. patient denies any chest pain or shortness a breath but does report some dysuria.  Urinalysis evidence of UTI, urine culture ordered and discharged with Augmentin.  Administered IV fluids here for supportive care.  Patient updated and agrees with plan for discharge, following a p.r.n. culture and PCP follow up.  Do not suspect PE or ACS    Amount and/or Complexity of Data Reviewed  Labs: ordered. Decision-making details documented in ED Course.  ECG/medicine tests: ordered and independent interpretation performed.     Details: EKG rate 88, QRS 72, , no STEMI    Risk  Prescription drug management.               ED Course as of 12/29/24 1945   Sun Dec 29, 2024   1725 WBC: 9.77 [KB]   1725 Hemoglobin: 12.6 [KB]   1725 Hematocrit: 40.6 [KB]   1817 BNP: 17 [KB]   1817 Troponin I: 0.021 [KB]   1817 Sodium: 136 [KB]   1817 Potassium(!): 3.0 [KB]   1817 Chloride: 99 [KB]   1817 CO2: 25 [KB]   1817 Glucose: 107 [KB]   1817 BUN: 7 [KB]   1817 Creatinine: 0.8 [KB]   1817 Calcium: 9.2 [KB]   1817 PROTEIN TOTAL: 8.1 [KB]   1817 Albumin: 3.9 [KB]   1817 BILIRUBIN TOTAL: 0.4 [KB]   1817 ALP: 132 [KB]   1817 AST: 26 [KB]   1817 ALT: 35 [KB]   1817 eGFR: >60 [KB]   1828 Magnesium : 1.6 [KB]   1828 hCG Qualitative, Urine: Negative [KB]   1908 RBC, UA(!): >100 [KB]   1908 WBC, UA(!): >100 [KB]   1908 Bacteria, UA(!): Many [KB]   1908 Leukocyte Esterase, UA(!): 3+ [KB]   1908 UROBILINOGEN UA(!): 4.0-6.0 [KB]   1908 Blood, UA(!): 3+ [KB]   1909 Ketones, UA(!): 1+ [KB]   1909 Protein, UA(!): 2+ [KB]   1909 Appearance, UA(!): Cloudy [KB]   1909 Color, UA(!): Brown [KB]      ED Course User Index  [KB] Rey Cosby Jr., DO                           Clinical Impression:  Final diagnoses:  [R55] Vasovagal syncope (Primary)  [E87.6] Hypokalemia  [N39.0, R31.9] Urinary tract infection with  hematuria, site unspecified          ED Disposition Condition    Discharge Stable          ED Prescriptions       Medication Sig Dispense Start Date End Date Auth. Provider    amoxicillin-clavulanate 875-125mg (AUGMENTIN) 875-125 mg per tablet Take 1 tablet by mouth 2 (two) times daily. 14 tablet 12/29/2024 -- Rey Cosby Jr.,           Follow-up Information    None          Rey Cosby Jr.,   12/29/24 1946

## 2024-12-30 NOTE — DISCHARGE INSTRUCTIONS
Follow up primary care physician within 1 week for repeat evaluation and return to ED for any worsening symptoms.

## 2024-12-31 LAB
BACTERIA UR CULT: NORMAL
BACTERIA UR CULT: NORMAL

## 2025-01-03 LAB
OHS QRS DURATION: 72 MS
OHS QTC CALCULATION: 433 MS

## (undated) DEVICE — PACK LAPAROSCOPY 44X90IN

## (undated) DEVICE — SOL NACL IRR 1000ML BTL

## (undated) DEVICE — SYS SEE SHARP SCOPE ANTIFOG

## (undated) DEVICE — TROCAR ENDO Z THREAD KII 5X100

## (undated) DEVICE — SOL POVIDONE PREP IODINE 4 OZ

## (undated) DEVICE — NDL PNEUMO INSUFFLATI 120MM

## (undated) DEVICE — SUT VICRYL+ 27 UR-6 VIOL

## (undated) DEVICE — GLOVE PI ULTRA TOUCH G SURGEON

## (undated) DEVICE — CATH URETHRAL RED 16FR

## (undated) DEVICE — TRAY SKIN SCRUB DRY PREMIUM

## (undated) DEVICE — STRIP MEDI WND CLSR 1/2X4IN

## (undated) DEVICE — SET TUBE PNEUMOCLEAR SE HI FLO

## (undated) DEVICE — FORCEP BPLR ENDOSCOPIC 310MM

## (undated) DEVICE — GOWN SMART IMP BREATHABLE XXLG

## (undated) DEVICE — ADHESIVE DERMABOND MINI HV

## (undated) DEVICE — ELECTRODE REM PLYHSV RETURN 9

## (undated) DEVICE — SUT MONOCRYL 4-0 PS-2

## (undated) DEVICE — CANNULA LAP SEAL Z THRD 5X100

## (undated) DEVICE — SEALER LIGASURE MARYLAND 37CM

## (undated) DEVICE — SWABSTICK BENZOIN 4 IN

## (undated) DEVICE — DRESSING ADH ISLAND 2.5 X 3

## (undated) DEVICE — SOL IRRI STRL WATER 1000ML

## (undated) DEVICE — TRAY GENERAL LAPAROSCOPY SMH